# Patient Record
Sex: MALE | Race: WHITE | NOT HISPANIC OR LATINO | Employment: OTHER | URBAN - METROPOLITAN AREA
[De-identification: names, ages, dates, MRNs, and addresses within clinical notes are randomized per-mention and may not be internally consistent; named-entity substitution may affect disease eponyms.]

---

## 2017-01-10 ENCOUNTER — HOSPITAL ENCOUNTER (OUTPATIENT)
Dept: WOUND CARE | Facility: HOSPITAL | Age: 67
Discharge: HOME | End: 2017-01-10
Attending: PODIATRIST | Admitting: PODIATRIST

## 2017-01-27 ENCOUNTER — CONVERSION ENCOUNTER (OUTPATIENT)
Dept: FAMILY MEDICINE CLINIC | Facility: CLINIC | Age: 67
End: 2017-01-27

## 2017-02-10 ENCOUNTER — CONVERSION ENCOUNTER (OUTPATIENT)
Dept: FAMILY MEDICINE CLINIC | Facility: CLINIC | Age: 67
End: 2017-02-10

## 2017-02-24 ENCOUNTER — CONVERSION ENCOUNTER (OUTPATIENT)
Dept: FAMILY MEDICINE CLINIC | Facility: CLINIC | Age: 67
End: 2017-02-24

## 2017-03-10 ENCOUNTER — CONVERSION ENCOUNTER (OUTPATIENT)
Dept: FAMILY MEDICINE CLINIC | Facility: CLINIC | Age: 67
End: 2017-03-10

## 2017-05-12 ENCOUNTER — HOSPITAL ENCOUNTER (OUTPATIENT)
Dept: CARDIOLOGY | Facility: HOSPITAL | Age: 67
Discharge: HOME | End: 2017-05-12
Attending: PHYSICIAN ASSISTANT | Admitting: PHYSICIAN ASSISTANT

## 2017-05-30 ENCOUNTER — CONVERSION ENCOUNTER (OUTPATIENT)
Dept: FAMILY MEDICINE CLINIC | Facility: CLINIC | Age: 67
End: 2017-05-30

## 2017-07-28 ENCOUNTER — CONVERSION ENCOUNTER (OUTPATIENT)
Dept: FAMILY MEDICINE CLINIC | Facility: CLINIC | Age: 67
End: 2017-07-28

## 2017-08-04 ENCOUNTER — HOSPITAL ENCOUNTER (OUTPATIENT)
Dept: FAMILY MEDICINE CLINIC | Facility: CLINIC | Age: 67
Setting detail: SPECIMEN
Discharge: HOME | End: 2017-08-04
Attending: FAMILY MEDICINE | Admitting: FAMILY MEDICINE

## 2017-08-04 LAB
ALBUMIN SERPL-MCNC: 3.7 G/DL (ref 3.5–4.8)
ALBUMIN/GLOB SERPL: 1.1 {RATIO} (ref 1–1.7)
ALP SERPL-CCNC: 91 IU/L (ref 32–91)
ALT SERPL-CCNC: 25 IU/L (ref 17–63)
ANION GAP SERPL CALC-SCNC: 14.6 MMOL/L (ref 10–20)
AST SERPL-CCNC: 22 IU/L (ref 15–41)
BILIRUB SERPL-MCNC: 1 MG/DL (ref 0.3–1.2)
BUN SERPL-MCNC: 6 MG/DL (ref 8–20)
BUN/CREAT SERPL: 7.5 (ref 6.2–20.3)
CALCIUM SERPL-MCNC: 9.7 MG/DL (ref 8.9–10.3)
CHLORIDE SERPL-SCNC: 103 MMOL/L (ref 101–111)
CHOLEST SERPL-MCNC: 135 MG/DL
CHOLEST/HDLC SERPL: 3.6 {RATIO}
CONV CO2: 27 MMOL/L (ref 22–32)
CONV LDL CHOLESTEROL DIRECT: 79 MG/DL (ref 0–100)
CONV MICROALBUM.,U,RANDOM: 76 MG/L
CONV TOTAL PROTEIN: 7 G/DL (ref 6.1–7.9)
CREAT 24H UR-MCNC: 89.4 MG/DL
CREAT UR-MCNC: 0.8 MG/DL (ref 0.7–1.2)
GLOBULIN UR ELPH-MCNC: 3.3 G/DL (ref 2.5–3.8)
GLUCOSE SERPL-MCNC: 106 MG/DL (ref 65–99)
HDLC SERPL-MCNC: 38 MG/DL
LDLC/HDLC SERPL: 2.1 {RATIO}
LIPID INTERPRETATION: ABNORMAL
MICROALBUMIN/CREAT UR: 85 UG/MG
POTASSIUM SERPL-SCNC: 4.6 MMOL/L (ref 3.6–5.1)
SODIUM SERPL-SCNC: 140 MMOL/L (ref 136–144)
TRIGL SERPL-MCNC: 94 MG/DL
TSH SERPL-ACNC: 3.57 UIU/ML (ref 0.34–5.6)
VLDLC SERPL CALC-MCNC: 18.9 MG/DL

## 2017-10-27 ENCOUNTER — CONVERSION ENCOUNTER (OUTPATIENT)
Dept: FAMILY MEDICINE CLINIC | Facility: CLINIC | Age: 67
End: 2017-10-27

## 2017-11-21 ENCOUNTER — HOSPITAL ENCOUNTER (OUTPATIENT)
Dept: FAMILY MEDICINE CLINIC | Facility: CLINIC | Age: 67
Setting detail: SPECIMEN
Discharge: HOME | End: 2017-11-21
Attending: FAMILY MEDICINE | Admitting: FAMILY MEDICINE

## 2017-11-21 LAB
ALBUMIN SERPL-MCNC: 3.8 G/DL (ref 3.5–4.8)
ALBUMIN/GLOB SERPL: 1.1 {RATIO} (ref 1–1.7)
ALP SERPL-CCNC: 71 IU/L (ref 32–91)
ALT SERPL-CCNC: 18 IU/L (ref 17–63)
ANION GAP SERPL CALC-SCNC: 11.3 MMOL/L (ref 10–20)
AST SERPL-CCNC: 20 IU/L (ref 15–41)
BILIRUB SERPL-MCNC: 1.1 MG/DL (ref 0.3–1.2)
BUN SERPL-MCNC: 8 MG/DL (ref 8–20)
BUN/CREAT SERPL: 10 (ref 6.2–20.3)
CALCIUM SERPL-MCNC: 9.7 MG/DL (ref 8.9–10.3)
CHLORIDE SERPL-SCNC: 104 MMOL/L (ref 101–111)
CHOLEST SERPL-MCNC: 151 MG/DL
CHOLEST/HDLC SERPL: 3.5 {RATIO}
CONV CO2: 28 MMOL/L (ref 22–32)
CONV LDL CHOLESTEROL DIRECT: 91 MG/DL (ref 0–100)
CONV TOTAL PROTEIN: 7.4 G/DL (ref 6.1–7.9)
CREAT UR-MCNC: 0.8 MG/DL (ref 0.7–1.2)
GLOBULIN UR ELPH-MCNC: 3.6 G/DL (ref 2.5–3.8)
GLUCOSE SERPL-MCNC: 61 MG/DL (ref 65–99)
HDLC SERPL-MCNC: 43 MG/DL
LDLC/HDLC SERPL: 2.1 {RATIO}
LIPID INTERPRETATION: NORMAL
POTASSIUM SERPL-SCNC: 4.3 MMOL/L (ref 3.6–5.1)
SODIUM SERPL-SCNC: 139 MMOL/L (ref 136–144)
TRIGL SERPL-MCNC: 101 MG/DL
VLDLC SERPL CALC-MCNC: 16.8 MG/DL

## 2017-12-01 ENCOUNTER — CONVERSION ENCOUNTER (OUTPATIENT)
Dept: FAMILY MEDICINE CLINIC | Facility: CLINIC | Age: 67
End: 2017-12-01

## 2018-02-12 ENCOUNTER — HOSPITAL ENCOUNTER (OUTPATIENT)
Dept: FAMILY MEDICINE CLINIC | Facility: CLINIC | Age: 68
Setting detail: SPECIMEN
Discharge: HOME | End: 2018-02-12
Attending: FAMILY MEDICINE | Admitting: FAMILY MEDICINE

## 2018-02-12 ENCOUNTER — CONVERSION ENCOUNTER (OUTPATIENT)
Dept: FAMILY MEDICINE CLINIC | Facility: CLINIC | Age: 68
End: 2018-02-12

## 2018-02-12 LAB
ANION GAP SERPL CALC-SCNC: 10.9 MMOL/L (ref 10–20)
BUN SERPL-MCNC: 9 MG/DL (ref 8–20)
BUN/CREAT SERPL: 11.3 (ref 6.2–20.3)
CALCIUM SERPL-MCNC: 9.2 MG/DL (ref 8.9–10.3)
CHLORIDE SERPL-SCNC: 102 MMOL/L (ref 101–111)
CONV CO2: 24 MMOL/L (ref 22–32)
CREAT UR-MCNC: 0.8 MG/DL (ref 0.7–1.2)
GLUCOSE SERPL-MCNC: 171 MG/DL (ref 65–99)
POTASSIUM SERPL-SCNC: 3.9 MMOL/L (ref 3.6–5.1)
SODIUM SERPL-SCNC: 133 MMOL/L (ref 136–144)

## 2018-09-20 RX ORDER — TAMSULOSIN HYDROCHLORIDE 0.4 MG/1
1 CAPSULE ORAL DAILY
COMMUNITY

## 2018-09-20 RX ORDER — OMEPRAZOLE 20 MG/1
20 CAPSULE, DELAYED RELEASE ORAL DAILY
COMMUNITY

## 2018-09-20 RX ORDER — ACETAMINOPHEN 325 MG/1
650 TABLET ORAL EVERY 6 HOURS PRN
COMMUNITY

## 2018-09-20 RX ORDER — PRAVASTATIN SODIUM 10 MG
10 TABLET ORAL DAILY
COMMUNITY

## 2018-09-20 RX ORDER — LISINOPRIL 10 MG/1
10 TABLET ORAL DAILY
COMMUNITY

## 2018-09-20 RX ORDER — CETIRIZINE HYDROCHLORIDE 10 MG/1
10 TABLET ORAL DAILY
COMMUNITY

## 2018-09-20 RX ORDER — FLUTICASONE PROPIONATE 50 MCG
2 SPRAY, SUSPENSION (ML) NASAL DAILY
COMMUNITY

## 2018-09-20 RX ORDER — NAPROXEN 500 MG/1
500 TABLET ORAL DAILY
COMMUNITY
End: 2018-12-14 | Stop reason: HOSPADM

## 2018-09-21 ENCOUNTER — OFFICE VISIT (OUTPATIENT)
Dept: NEUROSURGERY | Facility: CLINIC | Age: 68
End: 2018-09-21

## 2018-09-21 VITALS
BODY MASS INDEX: 32.14 KG/M2 | RESPIRATION RATE: 16 BRPM | WEIGHT: 200 LBS | HEIGHT: 66 IN | HEART RATE: 64 BPM | SYSTOLIC BLOOD PRESSURE: 120 MMHG | DIASTOLIC BLOOD PRESSURE: 60 MMHG

## 2018-09-21 DIAGNOSIS — G89.29 CHRONIC RIGHT-SIDED LOW BACK PAIN WITH LEFT-SIDED SCIATICA: Primary | ICD-10-CM

## 2018-09-21 DIAGNOSIS — C80.1 CANCER (HCC): ICD-10-CM

## 2018-09-21 DIAGNOSIS — I10 HYPERTENSION, UNSPECIFIED TYPE: ICD-10-CM

## 2018-09-21 DIAGNOSIS — M48.062 SPINAL STENOSIS, LUMBAR REGION, WITH NEUROGENIC CLAUDICATION: ICD-10-CM

## 2018-09-21 DIAGNOSIS — M54.42 CHRONIC RIGHT-SIDED LOW BACK PAIN WITH LEFT-SIDED SCIATICA: Primary | ICD-10-CM

## 2018-09-21 DIAGNOSIS — M54.16 LUMBAR RADICULOPATHY: ICD-10-CM

## 2018-09-21 DIAGNOSIS — M51.36 DDD (DEGENERATIVE DISC DISEASE), LUMBAR: ICD-10-CM

## 2018-09-21 PROBLEM — M54.50 LOW BACK PAIN: Status: ACTIVE | Noted: 2018-09-21

## 2018-09-21 PROCEDURE — 99204 OFFICE O/P NEW MOD 45 MIN: CPT | Performed by: NEUROLOGICAL SURGERY

## 2018-09-21 RX ORDER — METHOCARBAMOL 750 MG/1
750 TABLET, FILM COATED ORAL NIGHTLY
Status: ON HOLD | COMMUNITY
End: 2018-12-14 | Stop reason: SDUPTHER

## 2018-09-21 RX ORDER — GABAPENTIN 300 MG/1
600 CAPSULE ORAL NIGHTLY
COMMUNITY

## 2018-09-21 NOTE — PROGRESS NOTES
Subjective   Patient ID: Damion Elizabeth is a 68 y.o. male is being seen for consultation today at the request of JENIFER Sands for back pain.    History of Present Illness    He presents to the office today at the request of his primary care provider for back pain.  He has had MRI lumbar spine imaging in [place] on April 12, 2018.  He is here for a second opinion after being seen by a neurosurgeon in [place] who suggested surgery.    Today, he reports long-standing low back and leg pain that has progressively worsened over the past year.  He reports pain is constant in the low back, worse on the right side than the left.  He has pain in the right buttock just below the right hip that is a constant stabbing sensation, worse with position changes and movement.  Pain in his legs is also constant and radiates posteriorly into both calves.  He describes it as a chronic aching discomfort.    He had an accident in 2011 where his the right foot got caught in a machine.  Since then, he has had chronic right foot weakness and pain.  He takes gabapentin and Robaxin daily for the discomfort.  Overall, pain is worse with sitting.  He denies any bowel or bladder issues.  He denies any numbness or tingling in either leg.    He is noted weakness in the left lower extremity as well as a dull aching discomfort in the left lower extremity which occurs intermittently while seated but reliably occurs when he gets up and walks.  If he reclines in a recliner then he does not have much discomfort in the leg.  He has also noted weakness in this left leg which is not been improved despite conservative treatment including epidural injections.  The chronic aching discomfort in his left lower extremity was slightly improved with the epidural injection but only for short periods of time.    He presents with his wife and young granddaughter.      /60 (BP Location: Left arm, Patient Position: Sitting, Cuff Size: Adult)    "Pulse 64   Resp 16   Ht 167.6 cm (66\")   Wt 90.7 kg (200 lb)   BMI 32.28 kg/m²       The following portions of the patient's history were reviewed and updated as appropriate: allergies, current medications, past family history, past medical history, past social history, past surgical history and problem list.    Review of Systems   Constitutional: Negative for fever.   HENT: Positive for trouble swallowing (secondary to radiation to throat).    Eyes: Negative for visual disturbance.   Respiratory: Positive for cough (seasonal). Negative for wheezing.    Cardiovascular: Negative for chest pain and palpitations.   Gastrointestinal: Negative for abdominal pain.   Genitourinary: Positive for enuresis. Negative for difficulty urinating.   Musculoskeletal: Positive for back pain and gait problem.   Skin: Negative for rash.   Neurological: Positive for weakness (BLE) and numbness (BLE). Negative for dizziness.   Psychiatric/Behavioral: Positive for sleep disturbance.     Past Medical History:   Diagnosis Date   • Arthritis    • Cancer (CMS/HCC)    • Hyperlipidemia    • Hypertension    • Low back pain        Past Surgical History:   Procedure Laterality Date   • FOOT SURGERY Right 2011   • INNER EAR SURGERY  1990    Ear Drum repair   • KNEE SURGERY  1985    cyst removal   • LEG DEBRIDEMENT Right 2011   • NECK DISSECTION  2015   • TONSILLECTOMY         Social History     Social History   • Marital status:      Spouse name: N/A   • Number of children: N/A   • Years of education: N/A     Occupational History   • farmer      Social History Main Topics   • Smoking status: Current Every Day Smoker   • Smokeless tobacco: Never Used   • Alcohol use 1.8 oz/week     3 Cans of beer per week   • Drug use: No   • Sexual activity: Defer     Other Topics Concern   • Not on file     Social History Narrative   • No narrative on file       Family History   Problem Relation Age of Onset   • Heart disease Mother    • Cancer Father  "   • Heart disease Father         No Known Allergies      Current Outpatient Prescriptions:   •  acetaminophen (TYLENOL) 325 MG tablet, Take 650 mg by mouth Every 6 (Six) Hours As Needed for Mild Pain ., Disp: , Rfl:   •  cetirizine (zyrTEC) 10 MG tablet, Take 10 mg by mouth Daily., Disp: , Rfl:   •  DiphenhydrAMINE HCl (BENADRYL ALLERGY PO), Take  by mouth., Disp: , Rfl:   •  fluticasone (FLONASE) 50 MCG/ACT nasal spray, 2 sprays into the nostril(s) as directed by provider Daily., Disp: , Rfl:   •  gabapentin (NEURONTIN) 300 MG capsule, 300 mg. Two capsules at bedtime, Disp: , Rfl:   •  lisinopril (PRINIVIL,ZESTRIL) 10 MG tablet, Take 10 mg by mouth Daily., Disp: , Rfl:   •  methocarbamol (ROBAXIN) 750 MG tablet, Take 750 mg by mouth Every Night., Disp: , Rfl:   •  naproxen (NAPROSYN) 500 MG tablet, Take 500 mg by mouth 2 (Two) Times a Day With Meals., Disp: , Rfl:   •  omeprazole (priLOSEC) 20 MG capsule, Take 20 mg by mouth Daily., Disp: , Rfl:   •  pravastatin (PRAVACHOL) 10 MG tablet, Take 10 mg by mouth Daily., Disp: , Rfl:   •  tamsulosin (FLOMAX) 0.4 MG capsule 24 hr capsule, Take 1 capsule by mouth Every Night., Disp: , Rfl:     Objective   Physical Exam   Constitutional: He is oriented to person, place, and time. Vital signs are normal. He appears well-developed and well-nourished. He is cooperative.   Very pleasant older gentleman   HENT:   Head: Normocephalic and atraumatic.   Neck: Neck supple.   Cardiovascular: Intact distal pulses.    Pulmonary/Chest: Effort normal.   Abdominal: Soft.   Musculoskeletal: Normal range of motion. He exhibits tenderness (Very tender right sciatic notch as well as paraspinal musculature in the upper lumbar spine). He exhibits no deformity.        Lumbar back: He exhibits tenderness, bony tenderness and pain. He exhibits normal range of motion.   Moving all extremities well  Left lower extremity weakness in the gastroc, anterior tibialis and hamstrings 4/5  Full lumbar range  of motion, pain in all directions   Neurological: He is alert and oriented to person, place, and time. He displays atrophy (Right calf atrophy, chronic and unchanged). He displays no tremor and normal reflexes. No cranial nerve deficit. He exhibits normal muscle tone. Gait abnormal. Coordination normal. GCS eye subscore is 4. GCS verbal subscore is 5. GCS motor subscore is 6.   Reflex Scores:       Patellar reflexes are 2+ on the right side and 2+ on the left side.       Achilles reflexes are 2+ on the right side and 2+ on the left side.  Gait is stable and upright, dragging of the right foot  Unable to stand on heels and toes on the right, difficulty on the left  Unable to tandem  Positive straight leg raise on the right  2B clonus on the right   Skin: Skin is warm and dry.   Psychiatric: He has a normal mood and affect. His behavior is normal. Thought content normal.   Vitals reviewed.    Neurologic Exam     Mental Status   Oriented to person, place, and time.     Gait, Coordination, and Reflexes     Reflexes   Right patellar: 2+  Left patellar: 2+  Right achilles: 2+  Left achilles: 2+      Assessment/Plan   Independent Review of Radiographic Studies:    I personally reviewed an MRI scan of lumbar spine done recently: This demonstrates degenerative arthritic changes at L4 5 and evidence of prior laminectomy on the left at L4 5.  There is also a disc osteophyte complex eccentric to the left with compression of the exiting L4 nerve root and the traversing L5 nerve root in the lateral recess.  Medical Decision Making:    I confirmed and obtained the above history as recorded by the nurse practitioner acting as a scribe. I performed the above examination and it is documented by the nurse practitioner acting as a scribe.    The patient presents with left lower extremity radiculopathy and lumbar spinal stenosis with neurogenic claudication symptomatology.  I explained to the patient and his wife that I thought this was  because the disc space.  Previously been operated is now essentially completely subsided there is almost bone-on-bone at this level which is causing compression of the nerve root on the left side more so than the right.  Complicating this particular area at L4 5 is a disc osteophyte complex which I explained to be a bone spur that developed as well or to the left than to the right and I think that that is irritating these nerve.  This is been an ongoing problem and he is developed weakness in the lower extremity as documented above.    Treatment options include continued conservative treatment and just living his life with this ongoing pain and progressive or consideration of some kind of operative intervention to decompress the nerve root with the hope of a substantial improvement in his leg pain.  We also that the strength would improve also which I think would be a reasonable health.    The question really revolves around once the safest way in order to decompress the nerve root with minimal amount of invasion into his body.  I'm concerned about a simple lumbar decompression with minimally invasive techniques for 2 reasons; this area has previously been operated and therefore there is scar tissue on the nerve roots and manipulating the nerve roots from the back a second time increases the risk of damaging her permanent.  Additionally there is disc space subsidence which is caused the pedicles of the vertebral bodies of L4 and L5 to be more closely approximated and I am not sure that unroofing that neural foramen would be adequate to decompress the nerve since the nerve is being compressed by bone above and below.  I think really the safest way to approach this is to elisha the disc space back up to give more room around the nerve root.  This can be accomplished with minimally invasive technique and percutaneous rods posteriorly.    The surgical alternative to the anterior approach is a posterior fusion with lumbar  decompression which in my opinion is much more invasive and also would require lumbar fusion with posterior lateral fusion as well.    Explained to the patient with the oblique lateral interbody fusion approach with posterior instrumentation I would expect in overnight or perhaps 2 days in the hospital and then discharged.  I also explained that sometimes it takes longer.  On rare occasion individuals react differently to various kinds of operation.  I explained that he might have to stay longer and hospital and might even need to go to some chondral rehabilitation facility but that was not my expectation and that we could likely expect 1 or 2 days in hospital and then home.  I also explained that the recovery period for this is generally 4-6 weeks with some limited activities.  I encouraged him to be active postoperatively with a lot of walking but to avoid bending and twisting until we've released him back to full duties.    The risks, benefits, and alternatives of decompression and fusion were explained in detail to the patient. The alternative is not to perform an operative procedure. The benefit should be, though is not guaranteed, primarily a potential reduction in the neurosensory and/or motor dysfunction; secondarily, a possible reduction in back pain. The risks include, but are not limited to, the possibility of death, infection, stroke, bleeding, blindness, paralysis, blindness, lack of improvement in the patient's pain syndrome, worsening of the pain syndrome, meningitis, osteomyelitis, recurrent disc herniation, need for further operative intervention, recurrence of the pain syndrome, sexual dysfunction, incontinence, inability to urinate without catheterization, inability to have a normal bowel movement, epidural scarring resulting in chronic back pain, leakage of cerebral spinal fluid at the time of surgery or after recovery from surgery requiring further operative intervention, lack of bony fusion  requiring further surgery, instrumentation breakdown or pull out, adjacent level spinal degeneration, spinal instability. I also explained the realistic expectations as they pertain to the procedure. The patient voiced understanding of these risks, benefits, alternatives, and realistic expectations.    After a complete physical exam, the patient has been informed of the consequences, benefits, appropriate us, and office policies regarding the medication being prescribed. A ANIA check will be made on-line, and will be repeated if prescription is renewed after a 90 day period. The patient agrees to adhering to the medication regimen as prescribed.    The patient has been advised that we will manage post-operative pain for 1 month. If further narcotic medication is needed beyond that period, a referral back to the primary care physician or to a pain management specialist will be made. If the patient cancels or fails to show for scheduled follow-up visits or the pain management referral,  further narcotic prescriptions from this practice may cease.    The patient will give us a call when he is ready to schedule surgery.  We will have him come back for preoperative visit at that time to go over things once again and to arrange the surgery.    Damion was seen today for back pain.    Diagnoses and all orders for this visit:    Chronic right-sided low back pain with left-sided sciatica    Lumbar radiculopathy  -     Case Request; Standing  -     CBC and Differential; Future  -     Basic metabolic panel; Future  -     Sedimentation rate; Future  -     lactated ringers infusion; Infuse 100 mL/hr into a venous catheter Continuous.  -     ceFAZolin (ANCEF) 2 g in sodium chloride 0.9 % 100 mL IVPB; Infuse 2 g into a venous catheter 1 (One) Time.  -     dexamethasone (DECADRON) 4 mg in sodium chloride 0.9 % IVPB; Infuse 4 mg into a venous catheter Every 6 (Six) Hours.  -     Case Request    DDD (degenerative disc disease),  lumbar    Spinal stenosis, lumbar region, with neurogenic claudication  -     Case Request; Standing  -     CBC and Differential; Future  -     Basic metabolic panel; Future  -     Sedimentation rate; Future  -     lactated ringers infusion; Infuse 100 mL/hr into a venous catheter Continuous.  -     ceFAZolin (ANCEF) 2 g in sodium chloride 0.9 % 100 mL IVPB; Infuse 2 g into a venous catheter 1 (One) Time.  -     dexamethasone (DECADRON) 4 mg in sodium chloride 0.9 % IVPB; Infuse 4 mg into a venous catheter Every 6 (Six) Hours.  -     Case Request    Cancer (CMS/[facility])    Hypertension, unspecified type    Other orders  -     Follow anesthesia standing orders.  -     Clorhexidine skin prep; Future  -     Follow anesthesia standing orders.; Standing  -     Verify NPO Status; Standing  -     SCD (sequential compression device)- to be placed on patient in Pre-op; Standing  -     POC Glucose Once; Standing  -     Inpatient Admission; Standing  -     Obtain informed consent      Return for Patient to call this week about surgery..         November 6, 2018 The patient called back and requests surgical intervention.

## 2018-11-05 ENCOUNTER — TELEPHONE (OUTPATIENT)
Dept: NEUROSURGERY | Facility: CLINIC | Age: 68
End: 2018-11-05

## 2018-11-05 NOTE — TELEPHONE ENCOUNTER
Patient was last seen on 9/21 for LBP. Pt was busy with crops and could not schedule surgery at that time.     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  In his last office note Dr Verde said they need a visit before scheduling surgery.  Pt 's wife was hoping that he could get in and have surgery before the end of the year.    Dr Verde next available is on 12/14   Please advise      Shannon [phone]

## 2018-11-05 NOTE — TELEPHONE ENCOUNTER
Unfortunately at this time of the year we have multiple requests just like this. I gave him next available Friday, 12-14 with name on cancellation list.

## 2018-11-06 ENCOUNTER — TELEPHONE (OUTPATIENT)
Dept: NEUROSURGERY | Facility: CLINIC | Age: 68
End: 2018-11-06

## 2018-11-06 RX ORDER — DEXAMETHASONE SODIUM PHOSPHATE 4 MG/ML
4 INJECTION, SOLUTION INTRA-ARTICULAR; INTRALESIONAL; INTRAMUSCULAR; INTRAVENOUS; SOFT TISSUE EVERY 6 HOURS
Status: CANCELLED | OUTPATIENT
Start: 2018-12-13 | End: 2018-12-13

## 2018-11-06 RX ORDER — SODIUM CHLORIDE, SODIUM LACTATE, POTASSIUM CHLORIDE, CALCIUM CHLORIDE 600; 310; 30; 20 MG/100ML; MG/100ML; MG/100ML; MG/100ML
100 INJECTION, SOLUTION INTRAVENOUS CONTINUOUS
Status: CANCELLED | OUTPATIENT
Start: 2018-12-13

## 2018-11-06 RX ORDER — CEFAZOLIN SODIUM 2 G/100ML
2 INJECTION, SOLUTION INTRAVENOUS ONCE
Status: CANCELLED | OUTPATIENT
Start: 2018-12-13 | End: 2018-11-06

## 2018-11-08 ENCOUNTER — TELEPHONE (OUTPATIENT)
Dept: NEUROSURGERY | Facility: CLINIC | Age: 68
End: 2018-11-08

## 2018-11-08 DIAGNOSIS — Z98.890 POST-OPERATIVE STATE: ICD-10-CM

## 2018-11-08 DIAGNOSIS — M54.16 LUMBAR RADICULOPATHY: Primary | ICD-10-CM

## 2018-11-08 NOTE — TELEPHONE ENCOUNTER
Please enter order for Post Op lumbar xray AP/LAT for diagnosis of lumbar radiculopathy. His post op appt with on December 31 at 11:30 with Ana Cristina. Thanks!

## 2018-11-11 ENCOUNTER — RESULTS ENCOUNTER (OUTPATIENT)
Dept: NEUROSURGERY | Facility: CLINIC | Age: 68
End: 2018-11-11

## 2018-11-11 DIAGNOSIS — M54.16 LUMBAR RADICULOPATHY: ICD-10-CM

## 2018-11-11 DIAGNOSIS — M48.062 SPINAL STENOSIS, LUMBAR REGION, WITH NEUROGENIC CLAUDICATION: ICD-10-CM

## 2018-11-15 ENCOUNTER — APPOINTMENT (OUTPATIENT)
Dept: PREADMISSION TESTING | Facility: HOSPITAL | Age: 68
End: 2018-11-15

## 2018-11-15 ENCOUNTER — OFFICE VISIT (OUTPATIENT)
Dept: NEUROSURGERY | Facility: CLINIC | Age: 68
End: 2018-11-15

## 2018-11-15 VITALS
BODY MASS INDEX: 32.93 KG/M2 | DIASTOLIC BLOOD PRESSURE: 80 MMHG | RESPIRATION RATE: 16 BRPM | HEART RATE: 92 BPM | WEIGHT: 204 LBS | SYSTOLIC BLOOD PRESSURE: 140 MMHG

## 2018-11-15 VITALS
RESPIRATION RATE: 18 BRPM | HEIGHT: 67 IN | DIASTOLIC BLOOD PRESSURE: 87 MMHG | OXYGEN SATURATION: 97 % | TEMPERATURE: 99 F | SYSTOLIC BLOOD PRESSURE: 142 MMHG | BODY MASS INDEX: 31.64 KG/M2 | WEIGHT: 201.6 LBS | HEART RATE: 95 BPM

## 2018-11-15 DIAGNOSIS — M51.36 DDD (DEGENERATIVE DISC DISEASE), LUMBAR: ICD-10-CM

## 2018-11-15 DIAGNOSIS — M54.16 LUMBAR RADICULOPATHY: ICD-10-CM

## 2018-11-15 DIAGNOSIS — G89.29 CHRONIC RIGHT-SIDED LOW BACK PAIN WITH LEFT-SIDED SCIATICA: Primary | ICD-10-CM

## 2018-11-15 DIAGNOSIS — M54.42 CHRONIC RIGHT-SIDED LOW BACK PAIN WITH LEFT-SIDED SCIATICA: Primary | ICD-10-CM

## 2018-11-15 DIAGNOSIS — M48.062 SPINAL STENOSIS, LUMBAR REGION, WITH NEUROGENIC CLAUDICATION: ICD-10-CM

## 2018-11-15 LAB
ANION GAP SERPL CALCULATED.3IONS-SCNC: 15.2 MMOL/L
BASOPHILS # BLD AUTO: 0.02 10*3/MM3 (ref 0–0.2)
BASOPHILS NFR BLD AUTO: 0.4 % (ref 0–1.5)
BUN BLD-MCNC: 5 MG/DL (ref 8–23)
BUN/CREAT SERPL: 6.7 (ref 7–25)
CALCIUM SPEC-SCNC: 9.8 MG/DL (ref 8.6–10.5)
CHLORIDE SERPL-SCNC: 96 MMOL/L (ref 98–107)
CO2 SERPL-SCNC: 24.8 MMOL/L (ref 22–29)
CREAT BLD-MCNC: 0.75 MG/DL (ref 0.76–1.27)
DEPRECATED RDW RBC AUTO: 46.6 FL (ref 37–54)
EOSINOPHIL # BLD AUTO: 0.08 10*3/MM3 (ref 0–0.7)
EOSINOPHIL NFR BLD AUTO: 1.7 % (ref 0.3–6.2)
ERYTHROCYTE [DISTWIDTH] IN BLOOD BY AUTOMATED COUNT: 12.4 % (ref 11.5–14.5)
ERYTHROCYTE [SEDIMENTATION RATE] IN BLOOD: 1 MM/HR (ref 0–20)
GFR SERPL CREATININE-BSD FRML MDRD: 104 ML/MIN/1.73
GLUCOSE BLD-MCNC: 95 MG/DL (ref 65–99)
HCT VFR BLD AUTO: 45.4 % (ref 40.4–52.2)
HGB BLD-MCNC: 15.7 G/DL (ref 13.7–17.6)
IMM GRANULOCYTES # BLD: 0 10*3/MM3 (ref 0–0.03)
IMM GRANULOCYTES NFR BLD: 0 % (ref 0–0.5)
LYMPHOCYTES # BLD AUTO: 0.91 10*3/MM3 (ref 0.9–4.8)
LYMPHOCYTES NFR BLD AUTO: 19 % (ref 19.6–45.3)
MCH RBC QN AUTO: 35.3 PG (ref 27–32.7)
MCHC RBC AUTO-ENTMCNC: 34.6 G/DL (ref 32.6–36.4)
MCV RBC AUTO: 102 FL (ref 79.8–96.2)
MONOCYTES # BLD AUTO: 0.54 10*3/MM3 (ref 0.2–1.2)
MONOCYTES NFR BLD AUTO: 11.3 % (ref 5–12)
NEUTROPHILS # BLD AUTO: 3.24 10*3/MM3 (ref 1.9–8.1)
NEUTROPHILS NFR BLD AUTO: 67.6 % (ref 42.7–76)
PLATELET # BLD AUTO: 109 10*3/MM3 (ref 140–500)
PMV BLD AUTO: 9.6 FL (ref 6–12)
POTASSIUM BLD-SCNC: 3.9 MMOL/L (ref 3.5–5.2)
RBC # BLD AUTO: 4.45 10*6/MM3 (ref 4.6–6)
SODIUM BLD-SCNC: 136 MMOL/L (ref 136–145)
WBC NRBC COR # BLD: 4.79 10*3/MM3 (ref 4.5–10.7)

## 2018-11-15 PROCEDURE — 36415 COLL VENOUS BLD VENIPUNCTURE: CPT | Performed by: NEUROLOGICAL SURGERY

## 2018-11-15 PROCEDURE — 93005 ELECTROCARDIOGRAM TRACING: CPT

## 2018-11-15 PROCEDURE — 99213 OFFICE O/P EST LOW 20 MIN: CPT | Performed by: NEUROLOGICAL SURGERY

## 2018-11-15 PROCEDURE — 93010 ELECTROCARDIOGRAM REPORT: CPT | Performed by: INTERNAL MEDICINE

## 2018-11-15 PROCEDURE — 80048 BASIC METABOLIC PNL TOTAL CA: CPT | Performed by: NEUROLOGICAL SURGERY

## 2018-11-15 PROCEDURE — 85652 RBC SED RATE AUTOMATED: CPT | Performed by: NEUROLOGICAL SURGERY

## 2018-11-15 PROCEDURE — 85025 COMPLETE CBC W/AUTO DIFF WBC: CPT | Performed by: NEUROLOGICAL SURGERY

## 2018-11-15 NOTE — DISCHARGE INSTRUCTIONS
Take the following medications the morning of surgery with a small sip of water:  OMEPRAZOLE    Arrive to hospital on your day of surgery at 6:00 AM.      General Instructions:  • Do not eat or drink anything after midnight the night before surgery.  • Infants may have breast milk up to four hours before surgery.  • Infants drinking formula may drink formula up to six hours before surgery.   • Patients who avoid smoking, chewing tobacco and alcohol for 4 weeks prior to surgery have a reduced risk of post-operative complications.  Quit smoking as many days before surgery as you can.  • Do not smoke, use chewing tobacco or drink alcohol the day of surgery.   • If applicable bring your C-PAP/ BI-PAP machine.  • Bring any papers given to you in the doctor’s office.  • Wear clean comfortable clothes and socks.  • Do not wear contact lenses or make-up.  Bring a case for your glasses.   • Bring crutches or walker if applicable.  • Remove all piercings.  Leave jewelry and any other valuables at home.  • Hair extensions with metal clips must be removed prior to surgery.  • The Pre-Admission Testing nurse will instruct you to bring medications if unable to obtain an accurate list in Pre-Admission Testing.        If you were given a blood bank ID arm band remember to bring it with you the day of surgery.    Preventing a Surgical Site Infection:  • For 2 to 3 days before surgery, avoid shaving with a razor because the razor can irritate skin and make it easier to develop an infection.    • Any areas of open skin can increase the risk of a post-operative wound infection by allowing bacteria to enter and travel throughout the body.  Notify your surgeon if you have any skin wounds / rashes even if it is not near the expected surgical site.  The area will need assessed to determine if surgery should be delayed until it is healed.  • The night prior to surgery sleep in a clean bed with clean clothing.  Do not allow pets to sleep  with you.  • Shower on the morning of surgery using a fresh bar of anti-bacterial soap (such as Dial) and clean washcloth.  Dry with a clean towel and dress in clean clothing.  • Ask your surgeon if you will be receiving antibiotics prior to surgery.  • Make sure you, your family, and all healthcare providers clean their hands with soap and water or an alcohol based hand  before caring for you or your wound.    Day of surgery:  Upon arrival, a Pre-op nurse and Anesthesiologist will review your health history, obtain vital signs, and answer questions you may have.  The only belongings needed at this time will be your home medications and if applicable your C-PAP/BI-PAP machine.  If you are staying overnight your family can leave the rest of your belongings in the car and bring them to your room later.  A Pre-op nurse will start an IV and you may receive medication in preparation for surgery, including something to help you relax.  Your family will be able to see you in the Pre-op area.  While you are in surgery your family should notify the waiting room  if they leave the waiting room area and provide a contact phone number.    Please be aware that surgery does come with discomfort.  We want to make every effort to control your discomfort so please discuss any uncontrolled symptoms with your nurse.   Your doctor will most likely have prescribed pain medications.      If you are going home after surgery you will receive individualized written care instructions before being discharged.  A responsible adult must drive you to and from the hospital on the day of your surgery and stay with you for 24 hours.    If you are staying overnight following surgery, you will be transported to your hospital room following the recovery period.  [facility] has all private rooms.    You have received a list of surgical assistants for your reference.  If you have any questions please call  Pre-Admission Testing at [phone].  Deductibles and co-payments are collected on the day of service. Please be prepared to pay the required co-pay, deductible or deposit on the day of service as defined by your plan.

## 2018-11-15 NOTE — PROGRESS NOTES
Subjective   Patient ID: Damion Elizabeth is a 68 y.o. male is here today for follow-up of back pain and for surgery discussion.    History of Present Illness  Patient presents for presurgery discussion. He continues to have constant burning in his low back with bilateral posterior thigh pain that is dull, aching. It is aggravated by prolonged standing, sitting. He has chronic difficulty walking due to prior right foot crush injury. Some numbness posterior thighs. He has urinary frequency but no incontinence. He has PAT today and appt with Dr. Torres for surgical discussion on 11/27.     The following portions of the patient's history were reviewed and updated as appropriate: allergies, current medications, past family history, past medical history, past social history, past surgical history and problem list.    Review of Systems   Constitutional: Negative for fever.   HENT: Negative for postnasal drip and sore throat.    Respiratory: Negative for cough and wheezing.    Cardiovascular: Negative for chest pain and palpitations.   Genitourinary: Positive for frequency. Negative for difficulty urinating and enuresis.   Musculoskeletal: Positive for back pain (into both) and gait problem.   Neurological: Positive for weakness and numbness (posterior thighs).       Objective   Physical Exam   Constitutional: He is oriented to person, place, and time. He appears well-developed and well-nourished.   Body mass index is 32.93 kg/m².     Neck:   Taught skin left neck due to prior surgical resection and radiation of cancer   Cardiovascular: Normal rate, regular rhythm and normal heart sounds.   No murmur heard.  Pulmonary/Chest: Effort normal.   Abdominal: Soft.   Musculoskeletal:   TLSO brace   Neurological: He is oriented to person, place, and time. He has normal strength. Gait normal.   Reflex Scores:       Patellar reflexes are 2+ on the right side and 2+ on the left side.       Achilles reflexes are 1+ on the right side  and 1+ on the left side.  Skin: Skin is warm and dry.   Well healed midline lumbar incision   Psychiatric: He has a normal mood and affect. His behavior is normal. Judgment normal.   Vitals reviewed.    Neurologic Exam     Mental Status   Oriented to person, place, and time.   Level of consciousness: alert  Knowledge: good.   Normal comprehension.     Motor Exam   Muscle bulk: normal    Strength   Strength 5/5 throughout.     Sensory Exam   Right leg vibration: decreased from knee  Left leg vibration: normal    Gait, Coordination, and Reflexes     Gait  Gait: normal    Reflexes   Right patellar: 2+  Left patellar: 2+  Right achilles: 1+  Left achilles: 1+  Unable to toe walk on right; unable to heel walk bilaterally       Assessment/Plan   Independent Review of Radiographic Studies:    No new imaging    Medical Decision Making:    The patient is ready to schedule surgery.    I explained the risks, benefits, and alternatives of bar oblique lateral interbody fusion with posterior lateral fusion and instrumentation.  Risks include but are not limited to the possibility of death, infection, bleeding, vascular injury requiring more extensive surgery and vascular repair, transfusion, lack of fusion, instrumentation breakdown or pullout, need for further surgery, paralysis, bowel or bladder dysfunction, lack of improvement in pain syndrome etc.  I explained the alternative of surgery as being either a simple lumbar decompression or a transforaminal lumbar interbody fusion.  I explained that in my opinion the oblique lateral interbody fusion was a safer and more effective alternative than either of the other surgical alternatives.  I explained that the benefit should be though not guaranteed a reduction in the pain syndrome primarily in the legs and hopefully in the back.  The patient voiced understanding and requests that we proceed with surgical intervention.    After a complete physical exam, the patient has been  informed of the consequences, benefits, appropriate us, and office policies regarding the medication being prescribed. A ANIA check will be made on-line, and will be repeated if prescription is renewed after a 90 day period. The patient agrees to adhering to the medication regimen as prescribed.    The patient has been advised that we will manage post-operative pain for 1 month. If further narcotic medication is needed beyond that period, a referral back to the primary care physician or to a pain management specialist will be made. If the patient cancels or fails to show for scheduled follow-up visits or the pain management referral,  further narcotic prescriptions from this practice may cease.      Damion was seen today for lumbar surgery discussion.    Diagnoses and all orders for this visit:    Chronic right-sided low back pain with left-sided sciatica    Lumbar radiculopathy    Spinal stenosis, lumbar region, with neurogenic claudication    DDD (degenerative disc disease), lumbar      Return for Recheck 2 weeks after surgery, Follow up with nurse practitioner.

## 2018-11-27 ENCOUNTER — HOSPITAL ENCOUNTER (OUTPATIENT)
Dept: CARDIOLOGY | Facility: HOSPITAL | Age: 68
Discharge: HOME | End: 2018-11-27
Attending: INTERNAL MEDICINE | Admitting: INTERNAL MEDICINE

## 2018-11-27 ENCOUNTER — OFFICE VISIT (OUTPATIENT)
Dept: CARDIOLOGY | Facility: CLINIC | Age: 68
End: 2018-11-27

## 2018-11-27 VITALS
BODY MASS INDEX: 32.95 KG/M2 | DIASTOLIC BLOOD PRESSURE: 80 MMHG | SYSTOLIC BLOOD PRESSURE: 122 MMHG | HEIGHT: 66 IN | HEART RATE: 106 BPM | WEIGHT: 205 LBS | OXYGEN SATURATION: 97 %

## 2018-11-27 VITALS
DIASTOLIC BLOOD PRESSURE: 80 MMHG | BODY MASS INDEX: 32.95 KG/M2 | HEART RATE: 127 BPM | HEIGHT: 66 IN | WEIGHT: 205 LBS | SYSTOLIC BLOOD PRESSURE: 122 MMHG

## 2018-11-27 DIAGNOSIS — R06.02 SOB (SHORTNESS OF BREATH): ICD-10-CM

## 2018-11-27 DIAGNOSIS — R94.31 ABNORMAL ECG: ICD-10-CM

## 2018-11-27 DIAGNOSIS — R00.0 TACHYCARDIA: ICD-10-CM

## 2018-11-27 DIAGNOSIS — I10 HYPERTENSION, UNSPECIFIED TYPE: ICD-10-CM

## 2018-11-27 DIAGNOSIS — Z01.810 PREOP CARDIOVASCULAR EXAM: Primary | ICD-10-CM

## 2018-11-27 DIAGNOSIS — I10 HYPERTENSION, UNSPECIFIED TYPE: Chronic | ICD-10-CM

## 2018-11-27 DIAGNOSIS — Z01.810 PREOP CARDIOVASCULAR EXAM: ICD-10-CM

## 2018-11-27 LAB
AORTIC ARCH: 2.5 CM
AORTIC DIMENSIONLESS INDEX: 0.9 (DI)
ASCENDING AORTA: 3.3 CM
BH CV ECHO MEAS - ACS: 2.2 CM
BH CV ECHO MEAS - AO MAX PG (FULL): -0.03 MMHG
BH CV ECHO MEAS - AO MAX PG: 4.7 MMHG
BH CV ECHO MEAS - AO MEAN PG (FULL): 0.61 MMHG
BH CV ECHO MEAS - AO MEAN PG: 3.4 MMHG
BH CV ECHO MEAS - AO ROOT AREA (BSA CORRECTED): 2
BH CV ECHO MEAS - AO ROOT AREA: 13.1 CM^2
BH CV ECHO MEAS - AO ROOT DIAM: 4.1 CM
BH CV ECHO MEAS - AO V2 MAX: 108.6 CM/SEC
BH CV ECHO MEAS - AO V2 MEAN: 89.3 CM/SEC
BH CV ECHO MEAS - AO V2 VTI: 22.6 CM
BH CV ECHO MEAS - ASC AORTA: 3.3 CM
BH CV ECHO MEAS - AVA(I,A): 2.8 CM^2
BH CV ECHO MEAS - AVA(I,D): 2.8 CM^2
BH CV ECHO MEAS - AVA(V,A): 3.2 CM^2
BH CV ECHO MEAS - AVA(V,D): 3.2 CM^2
BH CV ECHO MEAS - BSA(HAYCOCK): 2.1 M^2
BH CV ECHO MEAS - BSA: 2 M^2
BH CV ECHO MEAS - BZI_BMI: 33.1 KILOGRAMS/M^2
BH CV ECHO MEAS - BZI_METRIC_HEIGHT: 167.6 CM
BH CV ECHO MEAS - BZI_METRIC_WEIGHT: 93 KG
BH CV ECHO MEAS - EDV(CUBED): 125.3 ML
BH CV ECHO MEAS - EDV(MOD-SP2): 60 ML
BH CV ECHO MEAS - EDV(MOD-SP4): 46 ML
BH CV ECHO MEAS - EDV(TEICH): 118.5 ML
BH CV ECHO MEAS - EF(CUBED): 64.4 %
BH CV ECHO MEAS - EF(MOD-BP): 69 %
BH CV ECHO MEAS - EF(MOD-SP2): 66.7 %
BH CV ECHO MEAS - EF(MOD-SP4): 67.4 %
BH CV ECHO MEAS - EF(TEICH): 55.7 %
BH CV ECHO MEAS - ESV(CUBED): 44.6 ML
BH CV ECHO MEAS - ESV(MOD-SP2): 20 ML
BH CV ECHO MEAS - ESV(MOD-SP4): 15 ML
BH CV ECHO MEAS - ESV(TEICH): 52.5 ML
BH CV ECHO MEAS - FS: 29.1 %
BH CV ECHO MEAS - IVS/LVPW: 1
BH CV ECHO MEAS - IVSD: 0.97 CM
BH CV ECHO MEAS - LAT PEAK E' VEL: 12 CM/SEC
BH CV ECHO MEAS - LV DIASTOLIC VOL/BSA (35-75): 22.8 ML/M^2
BH CV ECHO MEAS - LV MASS(C)D: 175.3 GRAMS
BH CV ECHO MEAS - LV MASS(C)DI: 86.7 GRAMS/M^2
BH CV ECHO MEAS - LV MAX PG: 4.7 MMHG
BH CV ECHO MEAS - LV MEAN PG: 2.8 MMHG
BH CV ECHO MEAS - LV SYSTOLIC VOL/BSA (12-30): 7.4 ML/M^2
BH CV ECHO MEAS - LV V1 MAX: 108.9 CM/SEC
BH CV ECHO MEAS - LV V1 MEAN: 80.9 CM/SEC
BH CV ECHO MEAS - LV V1 VTI: 19.7 CM
BH CV ECHO MEAS - LVIDD: 5 CM
BH CV ECHO MEAS - LVIDS: 3.5 CM
BH CV ECHO MEAS - LVLD AP2: 7.7 CM
BH CV ECHO MEAS - LVLD AP4: 7 CM
BH CV ECHO MEAS - LVLS AP2: 6.3 CM
BH CV ECHO MEAS - LVLS AP4: 6.1 CM
BH CV ECHO MEAS - LVOT AREA (M): 3.1 CM^2
BH CV ECHO MEAS - LVOT AREA: 3.2 CM^2
BH CV ECHO MEAS - LVOT DIAM: 2 CM
BH CV ECHO MEAS - LVPWD: 0.97 CM
BH CV ECHO MEAS - MED PEAK E' VEL: 6 CM/SEC
BH CV ECHO MEAS - MV A DUR: 0.1 SEC
BH CV ECHO MEAS - MV A MAX VEL: 119.7 CM/SEC
BH CV ECHO MEAS - MV DEC SLOPE: 819.2 CM/SEC^2
BH CV ECHO MEAS - MV DEC TIME: 0.08 SEC
BH CV ECHO MEAS - MV E MAX VEL: 48 CM/SEC
BH CV ECHO MEAS - MV E/A: 0.4
BH CV ECHO MEAS - MV MAX PG: 6.4 MMHG
BH CV ECHO MEAS - MV MEAN PG: 3.4 MMHG
BH CV ECHO MEAS - MV P1/2T MAX VEL: 66.5 CM/SEC
BH CV ECHO MEAS - MV P1/2T: 23.8 MSEC
BH CV ECHO MEAS - MV V2 MAX: 126.2 CM/SEC
BH CV ECHO MEAS - MV V2 MEAN: 84.2 CM/SEC
BH CV ECHO MEAS - MV V2 VTI: 16.6 CM
BH CV ECHO MEAS - MVA P1/2T LCG: 3.3 CM^2
BH CV ECHO MEAS - MVA(P1/2T): 9.3 CM^2
BH CV ECHO MEAS - MVA(VTI): 3.8 CM^2
BH CV ECHO MEAS - PA ACC TIME: 0.06 SEC
BH CV ECHO MEAS - PA MAX PG (FULL): 1.1 MMHG
BH CV ECHO MEAS - PA MAX PG: 6.2 MMHG
BH CV ECHO MEAS - PA PR(ACCEL): 52.1 MMHG
BH CV ECHO MEAS - PA V2 MAX: 124.9 CM/SEC
BH CV ECHO MEAS - PULM A REVS DUR: 0.1 SEC
BH CV ECHO MEAS - PULM A REVS VEL: 50.9 CM/SEC
BH CV ECHO MEAS - PULM DIAS VEL: 42.2 CM/SEC
BH CV ECHO MEAS - PULM S/D: 1.9
BH CV ECHO MEAS - PULM SYS VEL: 82 CM/SEC
BH CV ECHO MEAS - PVA(V,A): 3.1 CM^2
BH CV ECHO MEAS - PVA(V,D): 3.1 CM^2
BH CV ECHO MEAS - QP/QS: 0.83
BH CV ECHO MEAS - RV MAX PG: 5.1 MMHG
BH CV ECHO MEAS - RV MEAN PG: 3.5 MMHG
BH CV ECHO MEAS - RV V1 MAX: 113.2 CM/SEC
BH CV ECHO MEAS - RV V1 MEAN: 90.2 CM/SEC
BH CV ECHO MEAS - RV V1 VTI: 15.2 CM
BH CV ECHO MEAS - RVOT AREA: 3.4 CM^2
BH CV ECHO MEAS - RVOT DIAM: 2.1 CM
BH CV ECHO MEAS - SI(AO): 146.6 ML/M^2
BH CV ECHO MEAS - SI(CUBED): 39.9 ML/M^2
BH CV ECHO MEAS - SI(LVOT): 31.1 ML/M^2
BH CV ECHO MEAS - SI(MOD-SP2): 19.8 ML/M^2
BH CV ECHO MEAS - SI(MOD-SP4): 15.3 ML/M^2
BH CV ECHO MEAS - SI(TEICH): 32.6 ML/M^2
BH CV ECHO MEAS - SV(AO): 296.2 ML
BH CV ECHO MEAS - SV(CUBED): 80.7 ML
BH CV ECHO MEAS - SV(LVOT): 62.9 ML
BH CV ECHO MEAS - SV(MOD-SP2): 40 ML
BH CV ECHO MEAS - SV(MOD-SP4): 31 ML
BH CV ECHO MEAS - SV(RVOT): 52.3 ML
BH CV ECHO MEAS - SV(TEICH): 65.9 ML
BH CV ECHO MEAS - TAPSE (>1.6): 2.2 CM2
BH CV ECHO MEASUREMENTS AVERAGE E/E' RATIO: 5.33
BH CV VAS BP RIGHT ARM: NORMAL MMHG
BH CV XLRA - RV BASE: 2.5 CM
BH CV XLRA - TDI S': 18.3 CM/SEC
LEFT ATRIUM VOLUME INDEX: 14 ML/M2
LV EF 2D ECHO EST: 69 %
MAXIMAL PREDICTED HEART RATE: 154 BPM
SINUS: 3.5 CM
STJ: 3 CM
STRESS TARGET HR: 131 BPM

## 2018-11-27 PROCEDURE — 93306 TTE W/DOPPLER COMPLETE: CPT | Performed by: INTERNAL MEDICINE

## 2018-11-27 PROCEDURE — 93000 ELECTROCARDIOGRAM COMPLETE: CPT | Performed by: INTERNAL MEDICINE

## 2018-11-27 PROCEDURE — 93306 TTE W/DOPPLER COMPLETE: CPT

## 2018-11-27 PROCEDURE — 99205 OFFICE O/P NEW HI 60 MIN: CPT | Performed by: INTERNAL MEDICINE

## 2018-11-27 NOTE — PROGRESS NOTES
Subjective:     Encounter Date:11/27/2018      Patient ID: Damion Elizabeth is a 68 y.o. male.    Chief Complaint: preop cards  History of Present Illness    Dear Dr. Verde,    I had the pleasure of seeing this patient in the office today for initial evaluation and consultation.  He comes in for assessment of cardiac risk prior to lumbar fusion.  I appreciate the you sent him to see us.    He is scheduled for lumbar fusion on December 13.  He was recently in to see his oncologist and was noted to have a tachycardia around 100 beats a minute.  I notice when he was in your office his heart rate was close to 100 is well.  He doesn't have any sensation of tachycardia.  He has been getting short of breath at times but states this is been chronic for him.  There is been no recent change in his dyspnea, and is only with activity.  No shortness breath at rest.  No orthopnea or PND.  He has not had any lower extremity edema.  He denies any dizziness or lightheadedness.  He's had no chest pain, pressure, tightness, squeezing, or heartburn.  Sometimes he feels a little fatigued.  He had a stress test maybe 20 years ago that was normal.  He does have a history of prior throat cancer, and when he saw his oncologist last month he had blood work including a CBC, CMP, and thyroid function tests which were all normal.    This patient has no known cardiac history.  This patient has no history of coronary artery disease, congestive heart failure, rheumatic fever, rheumatic heart disease, congenital heart disease or heart murmur.  This patient has never required invasive cardiovascular evaluation.    The following portions of the patient's history were reviewed and updated as appropriate: allergies, current medications, past family history, past medical history, past social history, past surgical history and problem list.    Past Medical History:   Diagnosis Date   • Arthritis    • BPH (benign prostatic hyperplasia)    • Cancer  "(CMS/[facility])    • DDD (degenerative disc disease), lumbar    • GERD (gastroesophageal reflux disease)    • Hearing loss, right    • History of cancer tonsil 2015    HAD SURGERY, RADIATION AND CHEMO   • Hyperlipidemia    • Hypertension    • Low back pain     RADIATES DOWN BLE, WORSE ON LT   • Lumbar radiculopathy    • Shortness of breath     R/T \"CORN DUST\" FROM FARMING   • Spinal stenosis, lumbar region, with neurogenic claudication        Past Surgical History:   Procedure Laterality Date   • CATARACT EXTRACTION WITH INTRAOCULAR LENS IMPLANT     • FOOT SURGERY Right 2011    CRUSH INJURY   • INNER EAR SURGERY  1990    Ear Drum repair   • KNEE SURGERY  1985    cyst removal   • LEG DEBRIDEMENT Right 2011   • NECK DISSECTION  2015    LYMPH NODES REMOVED WHEN HAD TONSILAR CANCER   • TONSILLECTOMY  2015       Social History     Socioeconomic History   • Marital status:      Spouse name: Not on file   • Number of children: Not on file   • Years of education: Not on file   • Highest education level: Not on file   Social Needs   • Financial resource strain: Not on file   • Food insecurity - worry: Not on file   • Food insecurity - inability: Not on file   • Transportation needs - medical: Not on file   • Transportation needs - non-medical: Not on file   Occupational History   • Occupation: farmer   Tobacco Use   • Smoking status: Current Every Day Smoker     Packs/day: 1.00     Years: 25.00     Pack years: 25.00     Types: Cigarettes   • Smokeless tobacco: Never Used   • Tobacco comment: caff use   Substance and Sexual Activity   • Alcohol use: Yes     Comment: SOCIALLY   • Drug use: No   • Sexual activity: Defer   Other Topics Concern   • Not on file   Social History Narrative   • Not on file       Review of Systems   Constitution: Negative for chills, decreased appetite, fever and night sweats.   HENT: Negative for ear discharge, ear pain, hearing loss, nosebleeds and sore throat.    Eyes: Negative for blurred vision, " "double vision and pain.   Cardiovascular: Negative for cyanosis.   Respiratory: Negative for hemoptysis and sputum production.    Endocrine: Negative for cold intolerance and heat intolerance.   Hematologic/Lymphatic: Negative for adenopathy.   Skin: Negative for dry skin, itching, nail changes, rash and suspicious lesions.   Musculoskeletal: Negative for arthritis, gout, muscle cramps, muscle weakness, myalgias and neck pain.   Gastrointestinal: Negative for anorexia, bowel incontinence, constipation, diarrhea, dysphagia, hematemesis and jaundice.   Genitourinary: Negative for bladder incontinence, dysuria, flank pain, frequency, hematuria and nocturia.   Neurological: Negative for focal weakness, numbness, paresthesias and seizures.   Psychiatric/Behavioral: Negative for altered mental status, hallucinations, hypervigilance, suicidal ideas and thoughts of violence.   Allergic/Immunologic: Negative for persistent infections.         ECG 12 Lead  Date/Time: 11/27/2018 8:54 AM  Performed by: Romeo North III, MD  Authorized by: Romeo North III, MD   Comparison: compared with previous ECG   Similar to previous ECG  Rhythm: sinus rhythm  Rate: tachycardic  Conduction: incomplete RBBB  ST Segments: ST segments normal  T depression: V1  T flattening: aVL and V2  QRS axis: normal  Other findings: PRWP  Clinical impression: abnormal ECG               Objective:     Vitals:    11/27/18 0048 11/27/18 0842   BP:  122/80   Pulse: 94 (!) 127   Weight:  93 kg (205 lb)   Height:  167.6 cm (66\")         Physical Exam   Constitutional: He is oriented to person, place, and time. He appears well-developed and well-nourished. No distress.   HENT:   Head: Normocephalic and atraumatic.   Nose: Nose normal.   Mouth/Throat: Oropharynx is clear and moist.   Eyes: Conjunctivae and EOM are normal. Pupils are equal, round, and reactive to light. Right eye exhibits no discharge. Left eye exhibits no discharge.   Neck: Normal range of " motion. Neck supple. No tracheal deviation present. No thyromegaly present.   Cardiovascular: Normal rate, regular rhythm, S1 normal, S2 normal, normal heart sounds and normal pulses. Exam reveals no S3.   Pulmonary/Chest: Effort normal and breath sounds normal. No stridor. No respiratory distress. He exhibits no tenderness.   Abdominal: Soft. Bowel sounds are normal. He exhibits no distension and no mass. There is no tenderness. There is no rebound and no guarding.   Musculoskeletal: Normal range of motion. He exhibits no tenderness or deformity.   Lymphadenopathy:     He has no cervical adenopathy.   Neurological: He is alert and oriented to person, place, and time. He has normal reflexes.   Skin: Skin is warm and dry. No rash noted. He is not diaphoretic. No erythema.   Psychiatric: He has a normal mood and affect. Thought content normal.       Lab Review:             Performed  Records from [facility] are obtained and reviewed.    Laboratory data from [facility] is reviewed.    MRI from [facility] is reviewed and interpreted.  Abdominal aorta is normal.  Because of his dyspnea and tachycardia we performed an echocardiogram:  Interpretation Summary     · Left ventricular systolic function is normal. Estimated EF = 69%.                Assessment:          Diagnosis Plan   1. Preop cardiovascular exam  ECG 12 Lead    Adult Transthoracic Echo Complete W/ Cont if Necessary Per Protocol   2. Hypertension, unspecified type  Adult Transthoracic Echo Complete W/ Cont if Necessary Per Protocol   3. SOB (shortness of breath)  Adult Transthoracic Echo Complete W/ Cont if Necessary Per Protocol   4. Tachycardia  Adult Transthoracic Echo Complete W/ Cont if Necessary Per Protocol   5. Abnormal ECG  Adult Transthoracic Echo Complete W/ Cont if Necessary Per Protocol          Plan:       1.  Preoperative cardiovascular exam: This patient is at low cardiac risk for the anticipated surgical procedure.  He has a estimated risk  probability for perioperative myocardial infarction or cardiac arrest 0.01% ([place]).  2.  Essential hypertension-good control, continue current medical regimen  3.  Shortness of breath-normal echocardiogram  4.  Sinus tachycardia-normal echocardiogram, heart rate slowed after he was sitting down for a few minutes in the office.    Thank you very much for allowing us to participate in the care of this pleasant patient.  Please don't hesitate to call if I can be of assistance in any way.      Current Outpatient Medications:   •  acetaminophen (TYLENOL) 325 MG tablet, Take 650 mg by mouth Every 6 (Six) Hours As Needed for Mild Pain ., Disp: , Rfl:   •  cetirizine (zyrTEC) 10 MG tablet, Take 10 mg by mouth Daily., Disp: , Rfl:   •  DiphenhydrAMINE HCl (BENADRYL ALLERGY PO), Take 25 mg by mouth Every Evening., Disp: , Rfl:   •  fluticasone (FLONASE) 50 MCG/ACT nasal spray, 2 sprays into the nostril(s) as directed by provider Daily., Disp: , Rfl:   •  gabapentin (NEURONTIN) 300 MG capsule, Take 600 mg by mouth Every Night. Two capsules at bedtime , Disp: , Rfl:   •  lisinopril (PRINIVIL,ZESTRIL) 10 MG tablet, Take 10 mg by mouth Daily., Disp: , Rfl:   •  methocarbamol (ROBAXIN) 750 MG tablet, Take 750 mg by mouth Every Night., Disp: , Rfl:   •  Multiple Vitamins-Minerals (MULTIVITAMIN ADULT PO), Take 1 tablet by mouth Daily., Disp: , Rfl:   •  naproxen (NAPROSYN) 500 MG tablet, Take 500 mg by mouth Daily. HOLD PER MD INSTRUCTIONS, Disp: , Rfl:   •  omeprazole (priLOSEC) 20 MG capsule, Take 20 mg by mouth Daily., Disp: , Rfl:   •  pravastatin (PRAVACHOL) 10 MG tablet, Take 10 mg by mouth Daily., Disp: , Rfl:   •  tamsulosin (FLOMAX) 0.4 MG capsule 24 hr capsule, Take 1 capsule by mouth Daily., Disp: , Rfl:          EMR Dragon/Transcription disclaimer:    Much of this encounter note is an electronic transcription/translation of spoken language to printed text. The electronic translation of spoken language may permit  erroneous, or at times, nonsensical words or phrases to be inadvertently transcribed; Although I have reviewed the note for such errors, some may still exist.

## 2018-12-13 ENCOUNTER — ANESTHESIA EVENT (OUTPATIENT)
Dept: PERIOP | Facility: HOSPITAL | Age: 68
End: 2018-12-13

## 2018-12-13 ENCOUNTER — APPOINTMENT (OUTPATIENT)
Dept: GENERAL RADIOLOGY | Facility: HOSPITAL | Age: 68
End: 2018-12-13

## 2018-12-13 ENCOUNTER — ANESTHESIA (OUTPATIENT)
Dept: PERIOP | Facility: HOSPITAL | Age: 68
End: 2018-12-13

## 2018-12-13 ENCOUNTER — HOSPITAL ENCOUNTER (INPATIENT)
Facility: HOSPITAL | Age: 68
LOS: 1 days | Discharge: HOME | End: 2018-12-14
Attending: NEUROLOGICAL SURGERY | Admitting: NEUROLOGICAL SURGERY

## 2018-12-13 VITALS
RESPIRATION RATE: 16 BRPM | SYSTOLIC BLOOD PRESSURE: 129 MMHG | OXYGEN SATURATION: 97 % | HEIGHT: 67 IN | HEART RATE: 89 BPM | TEMPERATURE: 97.7 F | DIASTOLIC BLOOD PRESSURE: 85 MMHG | WEIGHT: 197.06 LBS | BODY MASS INDEX: 30.93 KG/M2

## 2018-12-13 DIAGNOSIS — R26.2 DIFFICULTY WALKING: Primary | ICD-10-CM

## 2018-12-13 DIAGNOSIS — M54.16 LUMBAR RADICULOPATHY: ICD-10-CM

## 2018-12-13 DIAGNOSIS — M48.062 SPINAL STENOSIS, LUMBAR REGION, WITH NEUROGENIC CLAUDICATION: ICD-10-CM

## 2018-12-13 PROBLEM — G89.29 CHRONIC RIGHT-SIDED LOW BACK PAIN WITH LEFT-SIDED SCIATICA: Status: ACTIVE | Noted: 2018-12-13

## 2018-12-13 PROBLEM — M54.42 CHRONIC RIGHT-SIDED LOW BACK PAIN WITH LEFT-SIDED SCIATICA: Status: ACTIVE | Noted: 2018-12-13

## 2018-12-13 LAB — GLUCOSE BLDC GLUCOMTR-MCNC: 110 MG/DL (ref 70–130)

## 2018-12-13 PROCEDURE — 82962 GLUCOSE BLOOD TEST: CPT

## 2018-12-13 PROCEDURE — 25010000002 DEXAMETHASONE PER 1 MG: Performed by: NURSE ANESTHETIST, CERTIFIED REGISTERED

## 2018-12-13 PROCEDURE — 25010000002 FENTANYL CITRATE (PF) 100 MCG/2ML SOLUTION: Performed by: ANESTHESIOLOGY

## 2018-12-13 PROCEDURE — C1769 GUIDE WIRE: HCPCS | Performed by: NEUROLOGICAL SURGERY

## 2018-12-13 PROCEDURE — C1713 ANCHOR/SCREW BN/BN,TIS/BN: HCPCS | Performed by: NEUROLOGICAL SURGERY

## 2018-12-13 PROCEDURE — 22558 ARTHRD ANT NTRBD MIN DSC LUM: CPT | Performed by: NEUROLOGICAL SURGERY

## 2018-12-13 PROCEDURE — 25010000002 FENTANYL CITRATE (PF) 100 MCG/2ML SOLUTION: Performed by: NURSE ANESTHETIST, CERTIFIED REGISTERED

## 2018-12-13 PROCEDURE — 25010000002 ONDANSETRON PER 1 MG: Performed by: NURSE ANESTHETIST, CERTIFIED REGISTERED

## 2018-12-13 PROCEDURE — 25010000002 PROPOFOL 10 MG/ML EMULSION: Performed by: NURSE ANESTHETIST, CERTIFIED REGISTERED

## 2018-12-13 PROCEDURE — 94799 UNLISTED PULMONARY SVC/PX: CPT

## 2018-12-13 PROCEDURE — 63710000001 DIPHENHYDRAMINE PER 50 MG: Performed by: NURSE PRACTITIONER

## 2018-12-13 PROCEDURE — 0SB20ZZ EXCISION OF LUMBAR VERTEBRAL DISC, OPEN APPROACH: ICD-10-PCS | Performed by: NEUROLOGICAL SURGERY

## 2018-12-13 PROCEDURE — 25010000003 CEFAZOLIN IN DEXTROSE 2-4 GM/100ML-% SOLUTION: Performed by: NEUROLOGICAL SURGERY

## 2018-12-13 PROCEDURE — 25010000002 SUCCINYLCHOLINE PER 20 MG: Performed by: NURSE ANESTHETIST, CERTIFIED REGISTERED

## 2018-12-13 PROCEDURE — 25010000002 PHENYLEPHRINE PER 1 ML: Performed by: NURSE ANESTHETIST, CERTIFIED REGISTERED

## 2018-12-13 PROCEDURE — 72100 X-RAY EXAM L-S SPINE 2/3 VWS: CPT

## 2018-12-13 PROCEDURE — 25010000002 HYDROMORPHONE PER 4 MG: Performed by: NURSE ANESTHETIST, CERTIFIED REGISTERED

## 2018-12-13 PROCEDURE — 22840 INSERT SPINE FIXATION DEVICE: CPT | Performed by: NEUROLOGICAL SURGERY

## 2018-12-13 PROCEDURE — 61783 SCAN PROC SPINAL: CPT | Performed by: NEUROLOGICAL SURGERY

## 2018-12-13 PROCEDURE — 25010000002 DEXAMETHASONE PER 1 MG: Performed by: NEUROLOGICAL SURGERY

## 2018-12-13 PROCEDURE — 25010000002 ONDANSETRON PER 1 MG: Performed by: NEUROLOGICAL SURGERY

## 2018-12-13 PROCEDURE — 0SG00A0 FUSION OF LUMBAR VERTEBRAL JOINT WITH INTERBODY FUSION DEVICE, ANTERIOR APPROACH, ANTERIOR COLUMN, OPEN APPROACH: ICD-10-PCS | Performed by: NEUROLOGICAL SURGERY

## 2018-12-13 PROCEDURE — 22853 INSJ BIOMECHANICAL DEVICE: CPT | Performed by: NEUROLOGICAL SURGERY

## 2018-12-13 PROCEDURE — 4A11X4G MONITORING OF PERIPHERAL NERVOUS ELECTRICAL ACTIVITY, INTRAOPERATIVE, EXTERNAL APPROACH: ICD-10-PCS | Performed by: NEUROLOGICAL SURGERY

## 2018-12-13 PROCEDURE — 25010000002 MIDAZOLAM PER 1 MG: Performed by: ANESTHESIOLOGY

## 2018-12-13 PROCEDURE — 76000 FLUOROSCOPY <1 HR PHYS/QHP: CPT

## 2018-12-13 DEVICE — ROD 1475005040 4.75 CCM SEXTANT 40MM
Type: IMPLANTABLE DEVICE | Site: SPINE LUMBAR | Status: FUNCTIONAL
Brand: CD HORIZON® SPINAL SYSTEM

## 2018-12-13 DEVICE — CAGE 4986055 CDALE PTC 18MM 6 DEG 10X55
Type: IMPLANTABLE DEVICE | Site: SPINE LUMBAR | Status: FUNCTIONAL
Brand: CLYDESDALE PTC™ SPINAL SYSTEM

## 2018-12-13 DEVICE — WAX BONE HEMO NAT 2.5G: Type: IMPLANTABLE DEVICE | Site: SPINE LUMBAR | Status: FUNCTIONAL

## 2018-12-13 DEVICE — DBM T44150 10CC ORTHOBLEND SMALL DEFGRAF
Type: IMPLANTABLE DEVICE | Site: SPINE LUMBAR | Status: FUNCTIONAL
Brand: GRAFTON®AND GRAFTON PLUS®DEMINERALIZED BONE MATRIX (DBM)

## 2018-12-13 RX ORDER — SODIUM CHLORIDE, SODIUM LACTATE, POTASSIUM CHLORIDE, CALCIUM CHLORIDE 600; 310; 30; 20 MG/100ML; MG/100ML; MG/100ML; MG/100ML
9 INJECTION, SOLUTION INTRAVENOUS CONTINUOUS
Status: DISCONTINUED | OUTPATIENT
Start: 2018-12-13 | End: 2018-12-14 | Stop reason: HOSPADM

## 2018-12-13 RX ORDER — GABAPENTIN 300 MG/1
600 CAPSULE ORAL NIGHTLY
Status: DISCONTINUED | OUTPATIENT
Start: 2018-12-13 | End: 2018-12-14 | Stop reason: HOSPADM

## 2018-12-13 RX ORDER — SODIUM CHLORIDE, SODIUM LACTATE, POTASSIUM CHLORIDE, CALCIUM CHLORIDE 600; 310; 30; 20 MG/100ML; MG/100ML; MG/100ML; MG/100ML
75 INJECTION, SOLUTION INTRAVENOUS CONTINUOUS
Status: DISCONTINUED | OUTPATIENT
Start: 2018-12-13 | End: 2018-12-14 | Stop reason: HOSPADM

## 2018-12-13 RX ORDER — FENTANYL CITRATE 50 UG/ML
INJECTION, SOLUTION INTRAMUSCULAR; INTRAVENOUS AS NEEDED
Status: DISCONTINUED | OUTPATIENT
Start: 2018-12-13 | End: 2018-12-13 | Stop reason: SURG

## 2018-12-13 RX ORDER — ONDANSETRON 2 MG/ML
4 INJECTION INTRAMUSCULAR; INTRAVENOUS ONCE AS NEEDED
Status: DISCONTINUED | OUTPATIENT
Start: 2018-12-13 | End: 2018-12-13 | Stop reason: HOSPADM

## 2018-12-13 RX ORDER — FLUTICASONE PROPIONATE 50 MCG
2 SPRAY, SUSPENSION (ML) NASAL DAILY
Status: DISCONTINUED | OUTPATIENT
Start: 2018-12-13 | End: 2018-12-14 | Stop reason: HOSPADM

## 2018-12-13 RX ORDER — DIPHENHYDRAMINE HCL 25 MG
25 CAPSULE ORAL EVERY 6 HOURS PRN
Status: CANCELLED | OUTPATIENT
Start: 2018-12-13

## 2018-12-13 RX ORDER — DIPHENHYDRAMINE HCL 25 MG
25 CAPSULE ORAL
Status: DISCONTINUED | OUTPATIENT
Start: 2018-12-13 | End: 2018-12-13 | Stop reason: HOSPADM

## 2018-12-13 RX ORDER — FAMOTIDINE 10 MG/ML
20 INJECTION, SOLUTION INTRAVENOUS ONCE
Status: COMPLETED | OUTPATIENT
Start: 2018-12-13 | End: 2018-12-13

## 2018-12-13 RX ORDER — CEFAZOLIN SODIUM 2 G/100ML
2 INJECTION, SOLUTION INTRAVENOUS ONCE
Status: COMPLETED | OUTPATIENT
Start: 2018-12-13 | End: 2018-12-13

## 2018-12-13 RX ORDER — MIDAZOLAM HYDROCHLORIDE 1 MG/ML
2 INJECTION INTRAMUSCULAR; INTRAVENOUS
Status: DISCONTINUED | OUTPATIENT
Start: 2018-12-13 | End: 2018-12-13 | Stop reason: HOSPADM

## 2018-12-13 RX ORDER — SODIUM CHLORIDE 9 MG/ML
INJECTION, SOLUTION INTRAVENOUS CONTINUOUS PRN
Status: DISCONTINUED | OUTPATIENT
Start: 2018-12-13 | End: 2018-12-13 | Stop reason: SURG

## 2018-12-13 RX ORDER — PROPOFOL 10 MG/ML
VIAL (ML) INTRAVENOUS AS NEEDED
Status: DISCONTINUED | OUTPATIENT
Start: 2018-12-13 | End: 2018-12-13 | Stop reason: SURG

## 2018-12-13 RX ORDER — PROMETHAZINE HYDROCHLORIDE 25 MG/ML
12.5 INJECTION, SOLUTION INTRAMUSCULAR; INTRAVENOUS ONCE AS NEEDED
Status: DISCONTINUED | OUTPATIENT
Start: 2018-12-13 | End: 2018-12-13 | Stop reason: HOSPADM

## 2018-12-13 RX ORDER — ONDANSETRON 4 MG/1
4 TABLET, FILM COATED ORAL EVERY 6 HOURS PRN
Status: DISCONTINUED | OUTPATIENT
Start: 2018-12-13 | End: 2018-12-14 | Stop reason: HOSPADM

## 2018-12-13 RX ORDER — MAGNESIUM HYDROXIDE 1200 MG/15ML
LIQUID ORAL AS NEEDED
Status: DISCONTINUED | OUTPATIENT
Start: 2018-12-13 | End: 2018-12-13 | Stop reason: HOSPADM

## 2018-12-13 RX ORDER — HYDROCODONE BITARTRATE AND ACETAMINOPHEN 5; 325 MG/1; MG/1
1 TABLET ORAL EVERY 4 HOURS PRN
Status: DISCONTINUED | OUTPATIENT
Start: 2018-12-13 | End: 2018-12-14 | Stop reason: HOSPADM

## 2018-12-13 RX ORDER — DIPHENHYDRAMINE HCL 25 MG
25 CAPSULE ORAL EVERY EVENING
Status: DISCONTINUED | OUTPATIENT
Start: 2018-12-13 | End: 2018-12-13

## 2018-12-13 RX ORDER — DEXAMETHASONE SODIUM PHOSPHATE 4 MG/ML
4 INJECTION, SOLUTION INTRA-ARTICULAR; INTRALESIONAL; INTRAMUSCULAR; INTRAVENOUS; SOFT TISSUE EVERY 6 HOURS
Status: COMPLETED | OUTPATIENT
Start: 2018-12-13 | End: 2018-12-13

## 2018-12-13 RX ORDER — METHOCARBAMOL 750 MG/1
750 TABLET, FILM COATED ORAL NIGHTLY
Status: DISCONTINUED | OUTPATIENT
Start: 2018-12-13 | End: 2018-12-14 | Stop reason: HOSPADM

## 2018-12-13 RX ORDER — LIDOCAINE HYDROCHLORIDE 20 MG/ML
INJECTION, SOLUTION INFILTRATION; PERINEURAL AS NEEDED
Status: DISCONTINUED | OUTPATIENT
Start: 2018-12-13 | End: 2018-12-13 | Stop reason: SURG

## 2018-12-13 RX ORDER — EPHEDRINE SULFATE 50 MG/ML
INJECTION, SOLUTION INTRAVENOUS AS NEEDED
Status: DISCONTINUED | OUTPATIENT
Start: 2018-12-13 | End: 2018-12-13 | Stop reason: SURG

## 2018-12-13 RX ORDER — HYDROCODONE BITARTRATE AND ACETAMINOPHEN 10; 325 MG/1; MG/1
1 TABLET ORAL EVERY 4 HOURS PRN
Status: DISCONTINUED | OUTPATIENT
Start: 2018-12-13 | End: 2018-12-14 | Stop reason: HOSPADM

## 2018-12-13 RX ORDER — FENTANYL CITRATE 50 UG/ML
50 INJECTION, SOLUTION INTRAMUSCULAR; INTRAVENOUS
Status: DISCONTINUED | OUTPATIENT
Start: 2018-12-13 | End: 2018-12-13 | Stop reason: HOSPADM

## 2018-12-13 RX ORDER — HYDROCODONE BITARTRATE AND ACETAMINOPHEN 10; 325 MG/1; MG/1
TABLET ORAL
Status: COMPLETED
Start: 2018-12-13 | End: 2018-12-13

## 2018-12-13 RX ORDER — CEFAZOLIN SODIUM 2 G/100ML
2 INJECTION, SOLUTION INTRAVENOUS EVERY 8 HOURS
Status: COMPLETED | OUTPATIENT
Start: 2018-12-13 | End: 2018-12-14

## 2018-12-13 RX ORDER — SODIUM CHLORIDE 0.9 % (FLUSH) 0.9 %
3-10 SYRINGE (ML) INJECTION AS NEEDED
Status: DISCONTINUED | OUTPATIENT
Start: 2018-12-13 | End: 2018-12-13 | Stop reason: HOSPADM

## 2018-12-13 RX ORDER — ACETAMINOPHEN 325 MG/1
650 TABLET ORAL EVERY 6 HOURS PRN
Status: DISCONTINUED | OUTPATIENT
Start: 2018-12-13 | End: 2018-12-14 | Stop reason: HOSPADM

## 2018-12-13 RX ORDER — TAMSULOSIN HYDROCHLORIDE 0.4 MG/1
0.4 CAPSULE ORAL DAILY
Status: DISCONTINUED | OUTPATIENT
Start: 2018-12-13 | End: 2018-12-14 | Stop reason: HOSPADM

## 2018-12-13 RX ORDER — ONDANSETRON 4 MG/1
4 TABLET, ORALLY DISINTEGRATING ORAL EVERY 6 HOURS PRN
Status: DISCONTINUED | OUTPATIENT
Start: 2018-12-13 | End: 2018-12-14 | Stop reason: HOSPADM

## 2018-12-13 RX ORDER — NALOXONE HCL 0.4 MG/ML
0.2 VIAL (ML) INJECTION AS NEEDED
Status: DISCONTINUED | OUTPATIENT
Start: 2018-12-13 | End: 2018-12-13 | Stop reason: HOSPADM

## 2018-12-13 RX ORDER — SODIUM CHLORIDE, SODIUM LACTATE, POTASSIUM CHLORIDE, CALCIUM CHLORIDE 600; 310; 30; 20 MG/100ML; MG/100ML; MG/100ML; MG/100ML
100 INJECTION, SOLUTION INTRAVENOUS CONTINUOUS
Status: DISCONTINUED | OUTPATIENT
Start: 2018-12-13 | End: 2018-12-14 | Stop reason: HOSPADM

## 2018-12-13 RX ORDER — SODIUM CHLORIDE 0.9 % (FLUSH) 0.9 %
3 SYRINGE (ML) INJECTION EVERY 12 HOURS SCHEDULED
Status: DISCONTINUED | OUTPATIENT
Start: 2018-12-13 | End: 2018-12-13 | Stop reason: HOSPADM

## 2018-12-13 RX ORDER — HYDROMORPHONE HYDROCHLORIDE 1 MG/ML
0.5 INJECTION, SOLUTION INTRAMUSCULAR; INTRAVENOUS; SUBCUTANEOUS
Status: DISCONTINUED | OUTPATIENT
Start: 2018-12-13 | End: 2018-12-13 | Stop reason: HOSPADM

## 2018-12-13 RX ORDER — DEXAMETHASONE SODIUM PHOSPHATE 4 MG/ML
INJECTION, SOLUTION INTRA-ARTICULAR; INTRALESIONAL; INTRAMUSCULAR; INTRAVENOUS; SOFT TISSUE AS NEEDED
Status: DISCONTINUED | OUTPATIENT
Start: 2018-12-13 | End: 2018-12-13 | Stop reason: SURG

## 2018-12-13 RX ORDER — DOCUSATE SODIUM 100 MG/1
100 CAPSULE, LIQUID FILLED ORAL 2 TIMES DAILY PRN
Status: DISCONTINUED | OUTPATIENT
Start: 2018-12-13 | End: 2018-12-14 | Stop reason: HOSPADM

## 2018-12-13 RX ORDER — PROMETHAZINE HYDROCHLORIDE 25 MG/1
25 SUPPOSITORY RECTAL ONCE AS NEEDED
Status: DISCONTINUED | OUTPATIENT
Start: 2018-12-13 | End: 2018-12-13 | Stop reason: HOSPADM

## 2018-12-13 RX ORDER — BISACODYL 5 MG/1
10 TABLET, DELAYED RELEASE ORAL DAILY PRN
Status: DISCONTINUED | OUTPATIENT
Start: 2018-12-13 | End: 2018-12-14 | Stop reason: HOSPADM

## 2018-12-13 RX ORDER — MIDAZOLAM HYDROCHLORIDE 1 MG/ML
1 INJECTION INTRAMUSCULAR; INTRAVENOUS
Status: DISCONTINUED | OUTPATIENT
Start: 2018-12-13 | End: 2018-12-13 | Stop reason: HOSPADM

## 2018-12-13 RX ORDER — ONDANSETRON 2 MG/ML
4 INJECTION INTRAMUSCULAR; INTRAVENOUS EVERY 6 HOURS PRN
Status: DISCONTINUED | OUTPATIENT
Start: 2018-12-13 | End: 2018-12-14 | Stop reason: HOSPADM

## 2018-12-13 RX ORDER — OXYCODONE HYDROCHLORIDE 5 MG/1
10 TABLET ORAL EVERY 4 HOURS PRN
Status: DISCONTINUED | OUTPATIENT
Start: 2018-12-13 | End: 2018-12-14 | Stop reason: HOSPADM

## 2018-12-13 RX ORDER — ATORVASTATIN CALCIUM 10 MG/1
10 TABLET, FILM COATED ORAL DAILY
Status: DISCONTINUED | OUTPATIENT
Start: 2018-12-13 | End: 2018-12-14 | Stop reason: HOSPADM

## 2018-12-13 RX ORDER — HYDROMORPHONE HCL 110MG/55ML
PATIENT CONTROLLED ANALGESIA SYRINGE INTRAVENOUS AS NEEDED
Status: DISCONTINUED | OUTPATIENT
Start: 2018-12-13 | End: 2018-12-13 | Stop reason: SURG

## 2018-12-13 RX ORDER — EPHEDRINE SULFATE 50 MG/ML
5 INJECTION, SOLUTION INTRAVENOUS ONCE AS NEEDED
Status: DISCONTINUED | OUTPATIENT
Start: 2018-12-13 | End: 2018-12-13 | Stop reason: HOSPADM

## 2018-12-13 RX ORDER — FLUMAZENIL 0.1 MG/ML
0.2 INJECTION INTRAVENOUS AS NEEDED
Status: DISCONTINUED | OUTPATIENT
Start: 2018-12-13 | End: 2018-12-13 | Stop reason: HOSPADM

## 2018-12-13 RX ORDER — SUCCINYLCHOLINE CHLORIDE 20 MG/ML
INJECTION INTRAMUSCULAR; INTRAVENOUS AS NEEDED
Status: DISCONTINUED | OUTPATIENT
Start: 2018-12-13 | End: 2018-12-13 | Stop reason: SURG

## 2018-12-13 RX ORDER — CETIRIZINE HYDROCHLORIDE 10 MG/1
10 TABLET ORAL DAILY
Status: DISCONTINUED | OUTPATIENT
Start: 2018-12-13 | End: 2018-12-14 | Stop reason: HOSPADM

## 2018-12-13 RX ORDER — ONDANSETRON 2 MG/ML
INJECTION INTRAMUSCULAR; INTRAVENOUS AS NEEDED
Status: DISCONTINUED | OUTPATIENT
Start: 2018-12-13 | End: 2018-12-13 | Stop reason: SURG

## 2018-12-13 RX ORDER — PROMETHAZINE HYDROCHLORIDE 25 MG/1
25 TABLET ORAL ONCE AS NEEDED
Status: DISCONTINUED | OUTPATIENT
Start: 2018-12-13 | End: 2018-12-13 | Stop reason: HOSPADM

## 2018-12-13 RX ORDER — LISINOPRIL 10 MG/1
10 TABLET ORAL DAILY
Status: DISCONTINUED | OUTPATIENT
Start: 2018-12-13 | End: 2018-12-14 | Stop reason: HOSPADM

## 2018-12-13 RX ORDER — DIPHENHYDRAMINE HCL 25 MG
25 CAPSULE ORAL EVERY 6 HOURS PRN
Status: DISCONTINUED | OUTPATIENT
Start: 2018-12-13 | End: 2018-12-14 | Stop reason: HOSPADM

## 2018-12-13 RX ORDER — PANTOPRAZOLE SODIUM 40 MG/1
40 TABLET, DELAYED RELEASE ORAL EVERY MORNING
Status: DISCONTINUED | OUTPATIENT
Start: 2018-12-14 | End: 2018-12-14 | Stop reason: HOSPADM

## 2018-12-13 RX ADMIN — HYDROMORPHONE HYDROCHLORIDE 0.5 MG: 1 INJECTION, SOLUTION INTRAMUSCULAR; INTRAVENOUS; SUBCUTANEOUS at 13:38

## 2018-12-13 RX ADMIN — METHOCARBAMOL TABLETS 750 MG: 750 TABLET, COATED ORAL at 22:21

## 2018-12-13 RX ADMIN — HYDROCODONE BITARTRATE AND ACETAMINOPHEN 1 TABLET: 10; 325 TABLET ORAL at 13:34

## 2018-12-13 RX ADMIN — SODIUM CHLORIDE, POTASSIUM CHLORIDE, SODIUM LACTATE AND CALCIUM CHLORIDE 75 ML/HR: 600; 310; 30; 20 INJECTION, SOLUTION INTRAVENOUS at 13:11

## 2018-12-13 RX ADMIN — SODIUM CHLORIDE: 9 INJECTION, SOLUTION INTRAVENOUS at 09:21

## 2018-12-13 RX ADMIN — HYDROMORPHONE HYDROCHLORIDE 0.5 MG: 2 INJECTION INTRAMUSCULAR; INTRAVENOUS; SUBCUTANEOUS at 11:28

## 2018-12-13 RX ADMIN — HYDROCODONE BITARTRATE AND ACETAMINOPHEN 1 TABLET: 10; 325 TABLET ORAL at 18:23

## 2018-12-13 RX ADMIN — HYDROMORPHONE HYDROCHLORIDE 0.5 MG: 2 INJECTION INTRAMUSCULAR; INTRAVENOUS; SUBCUTANEOUS at 11:23

## 2018-12-13 RX ADMIN — MIDAZOLAM HYDROCHLORIDE 1 MG: 2 INJECTION, SOLUTION INTRAMUSCULAR; INTRAVENOUS at 07:29

## 2018-12-13 RX ADMIN — FENTANYL CITRATE 100 MCG: 50 INJECTION, SOLUTION INTRAMUSCULAR; INTRAVENOUS at 08:11

## 2018-12-13 RX ADMIN — DIPHENHYDRAMINE HYDROCHLORIDE 25 MG: 25 CAPSULE ORAL at 16:17

## 2018-12-13 RX ADMIN — SODIUM CHLORIDE, POTASSIUM CHLORIDE, SODIUM LACTATE AND CALCIUM CHLORIDE 100 ML/HR: 600; 310; 30; 20 INJECTION, SOLUTION INTRAVENOUS at 06:32

## 2018-12-13 RX ADMIN — FENTANYL CITRATE 50 MCG: 50 INJECTION, SOLUTION INTRAMUSCULAR; INTRAVENOUS at 07:30

## 2018-12-13 RX ADMIN — CEFAZOLIN SODIUM 2 G: 2 INJECTION, SOLUTION INTRAVENOUS at 11:55

## 2018-12-13 RX ADMIN — DEXAMETHASONE SODIUM PHOSPHATE 12 MG: 4 INJECTION INTRA-ARTICULAR; INTRALESIONAL; INTRAMUSCULAR; INTRAVENOUS; SOFT TISSUE at 08:21

## 2018-12-13 RX ADMIN — EPHEDRINE SULFATE 5 MG: 50 INJECTION INTRAMUSCULAR; INTRAVENOUS; SUBCUTANEOUS at 09:30

## 2018-12-13 RX ADMIN — LIDOCAINE HYDROCHLORIDE 100 MG: 20 INJECTION, SOLUTION INFILTRATION; PERINEURAL at 08:15

## 2018-12-13 RX ADMIN — DEXAMETHASONE SODIUM PHOSPHATE 4 MG: 4 INJECTION, SOLUTION INTRAMUSCULAR; INTRAVENOUS at 06:32

## 2018-12-13 RX ADMIN — PHENYLEPHRINE HYDROCHLORIDE 200 MCG: 10 INJECTION INTRAVENOUS at 09:19

## 2018-12-13 RX ADMIN — EPHEDRINE SULFATE 5 MG: 50 INJECTION INTRAMUSCULAR; INTRAVENOUS; SUBCUTANEOUS at 09:35

## 2018-12-13 RX ADMIN — SUCCINYLCHOLINE CHLORIDE 200 MG: 20 INJECTION, SOLUTION INTRAMUSCULAR; INTRAVENOUS; PARENTERAL at 08:16

## 2018-12-13 RX ADMIN — FENTANYL CITRATE 50 MCG: 50 INJECTION, SOLUTION INTRAMUSCULAR; INTRAVENOUS at 13:13

## 2018-12-13 RX ADMIN — HYDROMORPHONE HYDROCHLORIDE 0.5 MG: 1 INJECTION, SOLUTION INTRAMUSCULAR; INTRAVENOUS; SUBCUTANEOUS at 13:12

## 2018-12-13 RX ADMIN — HYDROMORPHONE HYDROCHLORIDE 0.5 MG: 1 INJECTION, SOLUTION INTRAMUSCULAR; INTRAVENOUS; SUBCUTANEOUS at 13:24

## 2018-12-13 RX ADMIN — FENTANYL CITRATE 50 MCG: 50 INJECTION, SOLUTION INTRAMUSCULAR; INTRAVENOUS at 10:14

## 2018-12-13 RX ADMIN — CEFAZOLIN SODIUM 2 G: 2 INJECTION, SOLUTION INTRAVENOUS at 17:24

## 2018-12-13 RX ADMIN — FENTANYL CITRATE 50 MCG: 50 INJECTION, SOLUTION INTRAMUSCULAR; INTRAVENOUS at 10:06

## 2018-12-13 RX ADMIN — FAMOTIDINE 20 MG: 10 INJECTION, SOLUTION INTRAVENOUS at 07:29

## 2018-12-13 RX ADMIN — CEFAZOLIN SODIUM 2 G: 2 INJECTION, SOLUTION INTRAVENOUS at 08:06

## 2018-12-13 RX ADMIN — FENTANYL CITRATE 50 MCG: 50 INJECTION, SOLUTION INTRAMUSCULAR; INTRAVENOUS at 13:23

## 2018-12-13 RX ADMIN — ONDANSETRON 4 MG: 2 INJECTION INTRAMUSCULAR; INTRAVENOUS at 16:24

## 2018-12-13 RX ADMIN — GABAPENTIN 600 MG: 300 CAPSULE ORAL at 22:22

## 2018-12-13 RX ADMIN — ONDANSETRON 4 MG: 2 INJECTION INTRAMUSCULAR; INTRAVENOUS at 11:21

## 2018-12-13 RX ADMIN — HYDROCODONE BITARTRATE AND ACETAMINOPHEN 1 TABLET: 10; 325 TABLET ORAL at 22:22

## 2018-12-13 RX ADMIN — PROPOFOL 150 MG: 10 INJECTION, EMULSION INTRAVENOUS at 08:15

## 2018-12-13 RX ADMIN — PHENYLEPHRINE HYDROCHLORIDE 100 MCG: 10 INJECTION INTRAVENOUS at 10:58

## 2018-12-13 RX ADMIN — PHENYLEPHRINE HYDROCHLORIDE 200 MCG: 10 INJECTION INTRAVENOUS at 09:10

## 2018-12-13 RX ADMIN — DOCUSATE SODIUM 100 MG: 100 CAPSULE, LIQUID FILLED ORAL at 22:22

## 2018-12-13 RX ADMIN — PHENYLEPHRINE HYDROCHLORIDE 200 MCG: 10 INJECTION INTRAVENOUS at 11:00

## 2018-12-13 RX ADMIN — FENTANYL CITRATE 50 MCG: 50 INJECTION, SOLUTION INTRAMUSCULAR; INTRAVENOUS at 08:43

## 2018-12-13 NOTE — ANESTHESIA POSTPROCEDURE EVALUATION
"Patient: Damion Elizabeth    Procedure Summary     Date:  12/13/18 Room / Location:  [facility] OR [address] [place] [address] MAIN OR    Anesthesia Start:  0806 Anesthesia Stop:  1234    Procedures:       Left L4/5 obique lateral interbody fusion and interbody instrumentation with posterior lateral arthrodesis and posterior instrumentation (Left Spine Lumbar)      ANTERIOR EXPOSURE LUMBAR SPINE (N/A ) Diagnosis:       Lumbar radiculopathy      Spinal stenosis, lumbar region, with neurogenic claudication      (Lumbar radiculopathy [M54.16])      (Spinal stenosis, lumbar region, with neurogenic claudication [M48.062])    Surgeon:  Chavez Verde MD; Giorgi Dia MD Provider:  Cristhian Landeros MD    Anesthesia Type:  general ASA Status:  3          Anesthesia Type: general  Last vitals  BP   124/82 (12/13/18 1330)   Temp   36.4 °C (97.5 °F) (12/13/18 1229)   Pulse   112 (12/13/18 1330)   Resp   14 (12/13/18 1330)     SpO2   97 % (12/13/18 1330)     Post Anesthesia Care and Evaluation    Patient location during evaluation: bedside  Patient participation: complete - patient participated  Level of consciousness: awake and alert  Pain management: adequate  Airway patency: patent  Anesthetic complications: No anesthetic complications    Cardiovascular status: acceptable  Respiratory status: acceptable  Hydration status: acceptable    Comments: /82   Pulse 112   Temp 36.4 °C (97.5 °F) (Oral)   Resp 14   Ht 168.9 cm (66.5\")   Wt 89.4 kg (197 lb 1 oz)   SpO2 97%   BMI 31.33 kg/m²           "

## 2018-12-13 NOTE — PLAN OF CARE
Problem: Patient Care Overview  Goal: Plan of Care Review  Outcome: Ongoing (interventions implemented as appropriate)   12/13/18 4202   Coping/Psychosocial   Plan of Care Reviewed With patient;spouse   Plan of Care Review   Progress no change   OTHER   Outcome Summary pt alert and oriented. VSS. up to hair with 1 assist. c/o n/v. zofran given as ordered. norco prn given for incisional pain. will continue to monitor.

## 2018-12-13 NOTE — ANESTHESIA PREPROCEDURE EVALUATION
Anesthesia Evaluation     Patient summary reviewed and Nursing notes reviewed   no history of anesthetic complications:  NPO Solid Status: > 6 hours  NPO Liquid Status: > 6 hours           Airway   Mallampati: III  TM distance: <3 FB  Neck ROM: limited  Possible difficult intubation  Dental - normal exam     Pulmonary - negative pulmonary ROS and normal exam    breath sounds clear to auscultation  (-) rhonchi, decreased breath sounds, wheezes, rales, stridor  Cardiovascular - normal exam    NYHA Classification: I  ECG reviewed  Rhythm: regular  Rate: normal    (+) hypertension, hyperlipidemia,   (-) murmur, weak pulses, friction rub, systolic click, carotid bruits, JVD, peripheral edema      Neuro/Psych  (+) numbness,     GI/Hepatic/Renal/Endo    (+) obesity,  GERD,      Musculoskeletal     (+) back pain,   Abdominal  - normal exam    Abdomen: soft.   Substance History - negative use     OB/GYN negative ob/gyn ROS         Other   (+) arthritis   history of cancer remission                    Anesthesia Plan    ASA 3     general     intravenous induction   Anesthetic plan, all risks, benefits, and alternatives have been provided, discussed and informed consent has been obtained with: patient.

## 2018-12-13 NOTE — BRIEF OP NOTE
L4/5 oblique lateral interbody fusion with posterior instrumentation.     Damion ZAC Glenn  12/13/2018    Pre-op Diagnosis:   Lumbar radiculopathy [M54.16]  Spinal stenosis, lumbar region, with neurogenic claudication [M48.062]       Post-Op Diagnosis Codes:     * Lumbar radiculopathy [M54.16]     * Spinal stenosis, lumbar region, with neurogenic claudication [M48.062]    Procedure/CPT® Codes:      Procedure(s):  Left L4/5 obique lateral interbody fusion and interbody instrumentation with posterior lateral arthrodesis and posterior instrumentation  ANTERIOR EXPOSURE LUMBAR SPINE    Surgeon(s):  Chavez Verde MD Scherrer, Noah T, MD    Anesthesia: General    Staff:   Circulator: Gifty Garrett RN; Coby Chavez RN  Radiology Technologist: Tangela Jordan  Scrub Person: Chrystal Barbosa RN  Vendor Representative: Celestino Lind  Assistant: Rosita Ordoñez CSA    Estimated Blood Loss: 200 mL    Urine Voided: 200 mL    Specimens:                None      Drains:   Urethral Catheter Silicone (Active)       Findings: Severe DDD    Complications: none      Chavez Verde MD     Date: 12/13/2018  Time: 11:16 AM

## 2018-12-13 NOTE — ANESTHESIA PROCEDURE NOTES
ANESTHESIA INTUBATION  Urgency: elective    Date/Time: 12/13/2018 8:18 AM  Difficult airway    General Information and Staff    Patient location during procedure: OR  Anesthesiologist: Cristhian Landeros MD  CRNA: Rylie Montoya CRNA    Indications and Patient Condition  Indications for airway management: airway protection    Preoxygenated: yes  Mask difficulty assessment: 3 - difficult mask (inadequate, unstable or two providers) +/- NMBA    Final Airway Details  Final airway type: endotracheal airway      Successful airway: ETT  Cuffed: yes   Successful intubation technique: direct laryngoscopy and video laryngoscopy  Facilitating devices/methods: intubating stylet  Endotracheal tube insertion site: oral  Blade: CMAC  Blade size: D  ETT size (mm): 7.5  Cormack-Lehane Classification: grade III - view of epiglottis only  Placement verified by: chest auscultation   Measured from: lips  Number of attempts at approach: 2    Additional Comments  Only able to see epiglottis with CMAC; uable to pass the ETT the first time; Ventilated and 2nd attempt with the CMAC successful with EBBS: ETCO2+: Atraumatic intubation; Lips and teeth same as pre op

## 2018-12-13 NOTE — OP NOTE
Date of Admission:  12/13/2018  Today's Date:  12/13/18  Giorgi Dia MD    Preoperative Diagnosis:   L4/5 degenerative disc disease.    Postoperative Diagnosis:   Same    Procedure Performed:   OLIF L4/5  anterior exposure.    CPT:  16775 Arthrodesis, anterior interbody technique, including minimal discectomy to prepare interspace (other than for decompression); lumbar  +03441 application of biomechanical device(ie cage)  +02146 morcellized allograft    Surgeon:   Giorgi Dia MD    Co-Surgeon:     Chavez Verde MD    Anesthesia:   General    Estimated Blood Loss:   100-250 cc    Findings:    Successful anterior exposure to the lumbar spine through the oblique lateral interbody fusion technique.    Implants:    Implant Name Type Inv. Item Serial No.  Lot No. LRB No. Used   SCRW ST JORGE SOLERA SEXTANT - [id number] Implant SCRW ST JORGE SOLERA SEXTANT  [employer]  Left 4   SCRW JORGE SOLERA SEXTANT MAS 6.5X55MM - [id number] Implant SCRW JORGE SOLERA SEXTANT MAS 6.5X55MM  [employer]  Left 1   SCRW JORGE SOLERA SEXTANT MAS 5.5X55MM - [id number] Implant SCRW JORGE SOLERA SEXTANT MAS 5.5X55MM  [employer]  Left 1   SCRW JORGE SOLERA SEXTANT MAS 6.5X60MM - [id number] Implant SCRW JORGE SOLERA SEXTANT MAS 6.5X60MM  [employer]  Left 2   ORTHOBLEND DBM JESSICA 5CC SM - [id number] - [id number] Implant ORTHOBLEND DBM JESSICA 5CC SM [id number] [employer]  Left 1   ORTHOBLEND DBM JESSICA 10CC SM - [id number] - [id number] Implant ORTHOBLEND DBM JESSICA 10CC SM [id number] [employer]  Left 1   JEANETTE SOLERA SEXTANT COCR PREBNT 4.75X40 - [id number] Implant JEANETTE SOLERA SEXTANT COCR PREBNT 4.75X40  [employer]  Left 2   SPACR CLYDESDALE PTC 6D 56M61BD - [id number] Implant SPACR CLYDESDALE PTC 6D [id number]  [employer]  Left 1   WAX BONE 2.5G - [id number] Implant WAX BONE 2.5G  [employer]  DIV OF J AND J [id number] Left 1       Staff:   Circulator: Gifty Garrett RN; Coby Chavez, JOSE CARLOS  Radiology Technologist: Tangela Jordan  Scrub Person:  Melear, Chrystal R, RN  Vendor Representative: Celestino Lind  Assistant: Rosita Ordoñez CSA    Specimen:   none    Complications:   none    Dispo:   to PACU    Indication for procedure:   68 y.o. male with degenerative lumbar disease.  He's been evaluated by neurosurgery and felt to be a candidate for interbody fusion.  I discussed with him and his family the risks, benefits, and alternatives to anterior spinal exposure.  He agreed to the procedure informed consent was obtained.     Description of procedure:   The patient was taken to the operating room, anesthesia was induced. The patient was placed in the left lateral decubitus position with 90° left side up. A full surgical timeout was performed. There was posterior instrumentation done by Dr. Verde with Neurosurgery prior to my anterior approach. For complete documentation of Dr. Verde procedure, please see his operative report.      For the anterior exposure to the L4/5 vertebral body, the Medtronic Stealth technology was used to radha the disk space, one-third distance between the anterior border of the disk and the umbilicus was marked. A roughly 6 cm skin incision was made at this level. Bovie electrocautery was used to dissect down to the external oblique fascia. This was opened in the direction of the muscle fibers. A muscle splitting technique was used to go through the internal oblique and transversalis muscle. The retroperitoneal fat plane was encountered. Using a finger sweep technique, dissection of the retroperitoneal fat was performed from a posterior to anterior approach down to the psoas muscle. Psoas was followed anteriorly until the disk space was identified. Utilizing Stealth navigation the disk space was clearly identified, retraction was performed medial then using combination of a Bovie electrocautery and blunt dissection the psoas was dissected off the posterior disk space until adequate posterior and anterior exposure was pleasant.   Ammon performed the diskectomy and the interbody fusion. Please see his operative report for complete description.  Meticulous hemostasis was obtained.  The retroperitoneal fat was allowed to fall back into place. The transversalis and internal oblique were closed with the appropriate Vicryl suture. The external oblique was closed using a #1 PDS suture. An anterior abdominal wall nerve block was performed.  Vicryl was used to close the superficial fat.  4-0 Vicryl was used to close the skin in subcuticular manner.        Giorgi Dia MD  12/13/18     Active Hospital Problems    Diagnosis Date Noted   • Chronic right-sided low back pain with left-sided sciatica [M54.42, G89.29] 12/13/2018   • Lumbar radiculopathy [M54.16] 09/21/2018   • Spinal stenosis, lumbar region, with neurogenic claudication [M48.062] 09/21/2018      Resolved Hospital Problems   No resolved problems to display.

## 2018-12-14 PROCEDURE — 25010000003 CEFAZOLIN IN DEXTROSE 2-4 GM/100ML-% SOLUTION: Performed by: NEUROLOGICAL SURGERY

## 2018-12-14 PROCEDURE — 99024 POSTOP FOLLOW-UP VISIT: CPT | Performed by: NURSE PRACTITIONER

## 2018-12-14 PROCEDURE — 97110 THERAPEUTIC EXERCISES: CPT

## 2018-12-14 PROCEDURE — 97161 PT EVAL LOW COMPLEX 20 MIN: CPT

## 2018-12-14 RX ORDER — PSEUDOEPHEDRINE HCL 30 MG
100 TABLET ORAL 2 TIMES DAILY
Qty: 30 EACH | Refills: 1 | Status: SHIPPED | OUTPATIENT
Start: 2018-12-14 | End: 2018-12-31

## 2018-12-14 RX ORDER — HYDROCODONE BITARTRATE AND ACETAMINOPHEN 5; 325 MG/1; MG/1
2 TABLET ORAL EVERY 4 HOURS PRN
Qty: 50 TABLET | Refills: 0 | Status: SHIPPED | OUTPATIENT
Start: 2018-12-14 | End: 2018-12-23

## 2018-12-14 RX ORDER — METHOCARBAMOL 750 MG/1
750 TABLET, FILM COATED ORAL EVERY 6 HOURS PRN
Qty: 40 TABLET | Refills: 1 | Status: SHIPPED | OUTPATIENT
Start: 2018-12-14

## 2018-12-14 RX ADMIN — CETIRIZINE HYDROCHLORIDE 10 MG: 10 TABLET, FILM COATED ORAL at 09:37

## 2018-12-14 RX ADMIN — CEFAZOLIN SODIUM 2 G: 2 INJECTION, SOLUTION INTRAVENOUS at 03:06

## 2018-12-14 RX ADMIN — HYDROCODONE BITARTRATE AND ACETAMINOPHEN 1 TABLET: 10; 325 TABLET ORAL at 05:11

## 2018-12-14 RX ADMIN — LISINOPRIL 10 MG: 10 TABLET ORAL at 09:37

## 2018-12-14 RX ADMIN — TAMSULOSIN HYDROCHLORIDE 0.4 MG: 0.4 CAPSULE ORAL at 09:37

## 2018-12-14 RX ADMIN — PANTOPRAZOLE SODIUM 40 MG: 40 TABLET, DELAYED RELEASE ORAL at 07:04

## 2018-12-14 RX ADMIN — FLUTICASONE PROPIONATE 2 SPRAY: 50 SPRAY, METERED NASAL at 09:38

## 2018-12-14 RX ADMIN — HYDROCODONE BITARTRATE AND ACETAMINOPHEN 1 TABLET: 10; 325 TABLET ORAL at 13:31

## 2018-12-14 RX ADMIN — ATORVASTATIN CALCIUM 10 MG: 10 TABLET, FILM COATED ORAL at 09:37

## 2018-12-14 NOTE — PLAN OF CARE
Problem: Patient Care Overview  Goal: Plan of Care Review   12/14/18 0906   Coping/Psychosocial   Plan of Care Reviewed With patient   OTHER   Outcome Summary Pt s/p L4-5 fusion & instrumentation due to stenosis. Presents with decreased bed mob, txfs, gait, endurance. He will benefit from skilled PT to address deficits to faciltate recovery & improved function for return home.

## 2018-12-14 NOTE — DISCHARGE SUMMARY
Damion Elizabeth  1950    Patient Care Team:  Zahra Graham APRN as PCP - General (Family Medicine)    Date of Admit: 12/13/2018    Date of Discharge:  12/14/2018    Discharge Diagnosis:  Lumbar radiculopathy    Spinal stenosis, lumbar region, with neurogenic claudication    Chronic right-sided low back pain with left-sided sciatica      Procedures Performed  Procedure(s):  Left L4/5 obique lateral interbody fusion and interbody instrumentation with posterior lateral arthrodesis and posterior instrumentation  ANTERIOR EXPOSURE LUMBAR SPINE       Complications: none    Consultants:   Consults     No orders found for last 30 day(s).          Condition on Discharge: stable    Discharge disposition: home    Pertinent Test Results: none    Brief HPI: Patient evaluated in office for complaints of back and bilateral leg pain. Imaging revealed degenerative arthritic changes at L4 5 and evidence of prior laminectomy on the left at L4 5.  There is also a disc osteophyte complex eccentric to the left with compression of the exiting L4 nerve root and the traversing L5 nerve root in the lateral recess.. RBAs of treatment were discussed including the above procedure. Patient consented to above procedure.    Hospital Course: Patient admitted for above procedure. The procedure itself was without complication. The patient was transferred to [facility] following recovery.  He has done very well overnight.  He states that he has gotten up and walked several times.  He is wearing his brace as requested.  He has voided, tolerated diet.  Pain is controlled with oral medications.  He states that his leg pain is significantly better.  He has incisional discomfort as expected.  He feels OK to go home.    Discharge Physical Exam:    Temp:  [97.5 °F (36.4 °C)-97.8 °F (36.6 °C)] 97.7 °F (36.5 °C)  Heart Rate:  [] 89  Resp:  [14-16] 16  BP: (118-131)/(66-90) 129/85    Current labs:  Lab Results (last 24 hours)     ** No results  found for the last 24 hours. **            General Appearance No acute distress   Neurological Awake, Alert, and oriented x 3   Motor Strength normal ivelisse LE except left IP giveaway weakness due to pain, tone normal   Gait and station Ambulating    Back Multiple lumbar incisions well approximated with no drainage or swelling.  There is surrounding ecchymosis at each incision site    Extremities Moves all extremities well, no edema, no cyanosis, no redness   Abdomen  Soft, nondistended.  Bowel sounds ×4 present.  Slight tenderness in the left lower quadrant secondary to incision.  The incisions are well approximated with some surrounding swelling and tenderness but no drainage.     Discharge Medications  Josue has been reviewed and narcotic consent is on file in the patient's chart.  Due to postoperative status, patient will likely require more than 72 hours narcotics.     Your medication list      START taking these medications      Instructions Last Dose Given Next Dose Due   docusate sodium 100 MG capsule      Take 100 mg by mouth 2 (Two) Times a Day.       HYDROcodone-acetaminophen 5-325 MG per tablet  Commonly known as:  NORCO      Take 2 tablets by mouth Every 4 (Four) Hours As Needed for Moderate Pain  for up to 9 days.          CHANGE how you take these medications      Instructions Last Dose Given Next Dose Due   methocarbamol 750 MG tablet  Commonly known as:  ROBAXIN  What changed:    · when to take this  · reasons to take this      Take 1 tablet by mouth Every 6 (Six) Hours As Needed for Muscle Spasms.          CONTINUE taking these medications      Instructions Last Dose Given Next Dose Due   acetaminophen 325 MG tablet  Commonly known as:  TYLENOL      Take 650 mg by mouth Every 6 (Six) Hours As Needed for Mild Pain .       BENADRYL ALLERGY PO      Take 25 mg by mouth Every Evening.       cetirizine 10 MG tablet  Commonly known as:  zyrTEC      Take 10 mg by mouth Daily.       fluticasone 50 MCG/ACT  nasal spray  Commonly known as:  FLONASE      2 sprays into the nostril(s) as directed by provider Daily.       gabapentin 300 MG capsule  Commonly known as:  NEURONTIN      Take 600 mg by mouth Every Night. Two capsules at bedtime       lisinopril 10 MG tablet  Commonly known as:  PRINIVIL,ZESTRIL      Take 10 mg by mouth Daily.       omeprazole 20 MG capsule  Commonly known as:  priLOSEC      Take 20 mg by mouth Daily.       pravastatin 10 MG tablet  Commonly known as:  PRAVACHOL      Take 10 mg by mouth Daily.       tamsulosin 0.4 MG capsule 24 hr capsule  Commonly known as:  FLOMAX      Take 1 capsule by mouth Daily.          STOP taking these medications    MULTIVITAMIN ADULT PO        naproxen 500 MG tablet  Commonly known as:  NAPROSYN              Where to Get Your Medications      These medications were sent to [facility] - [place], [place] - [address] [facility] - [phone]  - [phone] [address] [address]    Phone:  [phone]   · docusate sodium 100 MG capsule  · methocarbamol 750 MG tablet     You can get these medications from any pharmacy    Bring a paper prescription for each of these medications  · HYDROcodone-acetaminophen 5-325 MG per tablet         Discharge Diet:   Diet Instructions     Diet:      Diet Texture / Consistency:  Regular    Advance as tolerated        Diet Order   Procedures   • Diet Regular       Activity at Discharge:   Activity Instructions     Discharge Activity      1) No driving until cleared by us and no longer taking narcotics.   2) Off work/school until cleared by us.  3) May shower but no submerging wound in tub, pool, etc.  4) Do not lift / push / pull more then 5 lbs.  5.) Wear brace at all times when sitting more than 30°, standing, walking; may have brace off for showers daily  6.) No bending or twisting    Pelvic Rest            Call for: questions or concerns    Follow-up Appointments  Future Appointments   Date  Time Provider Department [place]   12/31/2018 11:30 AM Ana Cristina Espinoza APRN [facility] None     Follow-up Information     Zahra Graham APRN .    Specialty:  Family Medicine  Contact information:  [address] [facility] [id number] [phone]                 Additional Instructions for the Follow-ups that You Need to Schedule     Notify Physician or Go To The ED For the Following Conditions   As directed      Including but not limited to fever >100.5, chills, wound concerns (redness, swelling, drainage), new symptoms of numbness, tingling, weakness; new or uncontrolled pain despite using prescribed medications    Order Comments:  Including but not limited to fever >100.5, chills, wound concerns (redness, swelling, drainage), new symptoms of numbness, tingling, weakness; new or uncontrolled pain despite using prescribed medications                Test Results Pending at Discharge     None    I discussed the discharge instructions with patient and family    JENIFER Christensen  12/14/18  11:56 AM    30 min spent in reviewing records, discussion and examination of the patient and discussion with other members of the patient's medical team.

## 2018-12-14 NOTE — PROGRESS NOTES
Name: Damion Elizabeth ADMIT: 2018   : 1950  PCP: Zahra Graham APRN    MRN: [id number] LOS: 1 days   AGE/SEX: 68 y.o. male  ROOM: [facility]   [place]illin, Post Op Global    No chief complaint on file.    CC: Postop follow-up.  Subjective     68 y.o. male overall doing well.  Has expected extensive incisional pain.  Legs are really feel better.  Did have one episode of vomiting) resolved and is currently tolerating liquid diet.    Review of Systems    Objective     Scheduled Medications:     atorvastatin 10 mg Oral Daily   cetirizine 10 mg Oral Daily   fluticasone 2 spray Nasal Daily   gabapentin 600 mg Oral Nightly   lisinopril 10 mg Oral Daily   methocarbamol 750 mg Oral Nightly   pantoprazole 40 mg Oral QAM   tamsulosin 0.4 mg Oral Daily       Active Infusions:    lactated ringers 100 mL/hr Last Rate: Stopped (18 0921)   lactated ringers 9 mL/hr    lactated ringers 75 mL/hr Last Rate: 75 mL/hr (18 1311)       As Needed Medications:  •  acetaminophen  •  bisacodyl  •  diphenhydrAMINE  •  docusate sodium  •  HYDROcodone-acetaminophen  •  HYDROcodone-acetaminophen  •  ondansetron **OR** ondansetron ODT **OR** ondansetron  •  oxyCODONE    Vital Signs  Vital Signs   Patient Vitals for the past 24 hrs:   BP Temp Temp src Pulse Resp SpO2   18 2106 129/85 97.7 °F (36.5 °C) Oral 89 16 --   18 1700 128/90 97.8 °F (36.6 °C) Oral 94 16 --   18 1417 127/88 -- Oral 104 16 97 %   18 1355 131/83 97.8 °F (36.6 °C) Oral 109 -- --   18 1330 124/82 -- -- 112 14 97 %   18 1310 121/78 -- -- 114 14 96 %   18 1300 118/75 -- -- 111 14 97 %   18 1245 122/73 -- -- 115 14 97 %   18 1240 128/66 -- -- 115 14 98 %   18 1235 122/70 -- -- (!) 124 14 97 %   18 1229 126/75 97.5 °F (36.4 °C) Oral (!) 125 14 97 %     I/O:  I/O last 3 completed shifts:  In: 5600 [I.V.:5600]  Out: 1820 [Urine:1620; Blood:200]    Physical Exam:  Physical Exam    Constitutional: He is oriented to person, place, and time. He appears well-developed.   Pulmonary/Chest: Effort normal. No respiratory distress.   Abdominal: Soft. He exhibits no distension.   Neurological: He is alert and oriented to person, place, and time.       Results Review:     CBC      BMP     Cr Clearance Estimated Creatinine Clearance: 96.1 mL/min (A) (by C-G formula based on SCr of 0.75 mg/dL (L)).  Coag     HbA1C No results found for: HGBA1C  Blood Glucose   Glucose   Date/Time Value Ref Range Status   12/13/2018 0601 110 70 - 130 mg/dL Final     Infection     Radiology(recent) No radiology results for the last day    Assessment/Plan     Assessment & Plan      Lumbar radiculopathy    Spinal stenosis, lumbar region, with neurogenic claudication    Chronic right-sided low back pain with left-sided sciatica      68 y.o. male postop day #1 L4-L5 oblique lateral interbody fusion with anterior exposure.  From my standpoint seems to be doing well.    Giorgi Dia MD  12/14/18  9:35 AM    Please call my office with any question: [phone]    Active Hospital Problems    Diagnosis Date Noted   • Chronic right-sided low back pain with left-sided sciatica [M54.42, G89.29] 12/13/2018   • Lumbar radiculopathy [M54.16] 09/21/2018   • Spinal stenosis, lumbar region, with neurogenic claudication [M48.062] 09/21/2018      Resolved Hospital Problems   No resolved problems to display.

## 2018-12-14 NOTE — PROGRESS NOTES
Discharge Planning Assessment  [facility]     Patient Name: Damion Elizabeth  MRN: [id number]  Today's Date: 12/14/2018    Admit Date: 12/13/2018    Discharge Needs Assessment     Row Name 12/14/18 1433       Living Environment    Lives With  spouse    Name(s) of Who Lives With Patient  spouse Maricruz Elizabeth [phone]    Current Living Arrangements  home/apartment/condo    Primary Care Provided by  self;spouse/significant other    Provides Primary Care For  no one    Family Caregiver if Needed  spouse    Family Caregiver Names  spouse Maricruz Elizabeth [phone]    Quality of Family Relationships  helpful    Able to Return to Prior Arrangements  yes       Resource/Environmental Concerns    Resource/Environmental Concerns  home accessibility    Home Accessibility Concerns  stairs to enter home 2 steps with no handrails to enter the home.    Transportation Concerns  car, none       Transition Planning    Patient/Family Anticipates Transition to  home with family    Patient/Family Anticipated Services at Transition  durable medical equipment    Transportation Anticipated  family or friend will provide       Discharge Needs Assessment    Concerns to be Addressed  decision making    Equipment Currently Used at Home  walker, rolling    Anticipated Changes Related to Illness  none    Equipment Needed After Discharge  commode    Offered/Gave Vendor List  yes    Current Discharge Risk  physical impairment        Discharge Plan     Row Name 12/14/18 1431       Plan    Plan  Home with assistance from his wife; denies any needs and has an order for a 3 in 1 commode in case needed but did not want CCP to order for him.    Plan Comments  Met with the patient at bedside; explained role of CCP, verified facesheet, checked IMM and discussed discharge planning needs.  The patient plans to return home with assistance from his spouse, has walker, does not want CCP to order the 3 in 1 commode for him but wants to take the order home  in case he feels he needs one later on as he does not feel like he currently needs one.  The patient has 2 steps to enter his single story home in [facility]), his PCP is Zahra Graham, pharmacy is [facility] pharmacy and he denies any trouble remembering to take his medication or with affording his medication.  The patient is unsure who he has used for HH in the past, denies any SNF history, denies any POA documents and states that his wife will transport him home upon d/c.  The patient was provided with HH/SNF list and CCP telephone contact number in case any additional information is needed if the patient attempts to get a 3 in1 commode.  CCP will follow to assist with the patient's d/c home with an order for a 3 in 1 commode (copy provided to patient, copy placed in patient's chart and copy placed in HIM basket).  SHAMEKA Golden        Destination      No service coordination in this encounter.      Durable Medical Equipment      No service coordination in this encounter.      Dialysis/Infusion      No service coordination in this encounter.      Home Medical Care      No service coordination in this encounter.      Community Resources      No service coordination in this encounter.        Expected Discharge Date and Time     Expected Discharge Date Expected Discharge Time    Dec 14, 2018         Demographic Summary     Row Name 12/14/18 1432       General Information    Admission Type  inpatient    Arrived From  home    Referral Source  nursing;physician    Reason for Consult  discharge planning    Preferred Language  English     Used During This Interaction  no        Functional Status     Row Name 12/14/18 1432       Functional Status    Usual Activity Tolerance  good    Current Activity Tolerance  fair       Functional Status, IADL    Medications  assistive equipment    Meal Preparation  assistive equipment    Housekeeping  assistive equipment    Laundry  assistive  equipment    Shopping  assistive equipment       Mental Status    General Appearance WDL  WDL       Mental Status Summary    Recent Changes in Mental Status/Cognitive Functioning  no changes        Psychosocial    No documentation.       Abuse/Neglect    No documentation.       Legal    No documentation.       Substance Abuse    No documentation.       Patient Forms     Row Name 12/14/18 1546       Patient Forms    Provider Choice List  Delivered    Delivered to  Patient    Method of delivery  In person            SHAMEKA Villalobos

## 2018-12-14 NOTE — PROGRESS NOTES
Case Management Discharge Note    Final Note: Home; no needs identified.  SHAMEKA Golden    Destination      No service has been selected for the patient.      Durable Medical Equipment      No service has been selected for the patient.      Dialysis/Infusion      No service has been selected for the patient.      Home Medical Care      No service has been selected for the patient.      Community Resources      No service has been selected for the patient.        Other: Other(Private auto)    Final Discharge Disposition Code: 01 - home or self-care

## 2018-12-14 NOTE — THERAPY EVALUATION
"Acute Care - Physical Therapy Initial Evaluation  [facility]     Patient Name: Damion Elizabeth  : 1950  MRN: [id number]  Today's Date: 2018   Onset of Illness/Injury or Date of Surgery: 18  Date of Referral to PT: 18         Admit Date: 2018    Visit Dx:     ICD-10-CM ICD-9-CM   1. Difficulty walking R26.2 719.7   2. Lumbar radiculopathy M54.16 724.4   3. Spinal stenosis, lumbar region, with neurogenic claudication M48.062 724.03     Patient Active Problem List   Diagnosis   • DDD (degenerative disc disease), lumbar   • Low back pain   • Lumbar radiculopathy   • Spinal stenosis, lumbar region, with neurogenic claudication   • Cancer (CMS/HCC)   • Hypertension   • Chronic right-sided low back pain with left-sided sciatica     Past Medical History:   Diagnosis Date   • Arthritis    • BPH (benign prostatic hyperplasia)    • Cancer (CMS/HCC)    • DDD (degenerative disc disease), lumbar    • GERD (gastroesophageal reflux disease)    • Hearing loss, right    • History of cancer tonsil 2015    HAD SURGERY, RADIATION AND CHEMO   • Hyperlipidemia    • Hypertension    • Low back pain     RADIATES DOWN BLE, WORSE ON LT   • Lumbar radiculopathy    • Shortness of breath     R/T \"CORN DUST\" FROM FARMING   • Spinal stenosis, lumbar region, with neurogenic claudication      Past Surgical History:   Procedure Laterality Date   • CATARACT EXTRACTION WITH INTRAOCULAR LENS IMPLANT     • FOOT SURGERY Right     CRUSH INJURY   • INNER EAR SURGERY      Ear Drum repair   • KNEE SURGERY      cyst removal   • LEG DEBRIDEMENT Right    • NECK DISSECTION      LYMPH NODES REMOVED WHEN HAD TONSILAR CANCER   • TONSILLECTOMY          PT ASSESSMENT (last 12 hours)      Physical Therapy Evaluation     Row Name 18 0859          PT Evaluation Time/Intention    Subjective Information  complains of;weakness;pain  -EF     Document Type  evaluation  -EF     Mode of Treatment  physical therapy "  -EF     Patient Effort  good  -EF     Symptoms Noted During/After Treatment  fatigue  -EF     Row Name 12/14/18 0859          General Information    Patient Profile Reviewed?  yes  -EF     Onset of Illness/Injury or Date of Surgery  12/13/18  -EF     Patient Observations  alert;cooperative;agree to therapy  -EF     General Observations of Patient  sitting up in chair  -EF     Prior Level of Function  independent:  -EF     Pertinent History of Current Functional Problem  s/p L4-5 fusion & instrumentation due to stenosis.  -EF     Existing Precautions/Restrictions  brace worn when out of bed;fall;spinal  -EF     Row Name 12/14/18 0859          Cognitive Assessment/Intervention- PT/OT    Orientation Status (Cognition)  oriented x 4  -EF     Follows Commands (Cognition)  WNL  -EF     Row Name 12/14/18 0859          Bed Mobility Assessment/Treatment    Comment (Bed Mobility)  NT-up in chair  -EF     Row Name 12/14/18 0859          Transfer Assessment/Treatment    Transfer Assessment/Treatment  sit-stand transfer;stand-sit transfer  -EF     Sit-Stand [place] (Transfers)  supervision;contact guard  -EF     Stand-Sit [place] (Transfers)  supervision;contact guard  -EF     Row Name 12/14/18 0859          Gait/Stairs Assessment/Training    [place] Level (Gait)  supervision;contact guard  -EF     Assistive Device (Gait)  -- pt pushed IV pole  -EF     Distance in Feet (Gait)  80  -EF     Deviations/Abnormal Patterns (Gait)  gait speed decreased  -EF     Row Name 12/14/18 0859          General ROM    GENERAL ROM COMMENTS  WFL  -EF     Row Name 12/14/18 0859          MMT (Manual Muscle Testing)    General MMT Comments  >3/5 throughout  -EF     Row Name 12/14/18 0859          Motor Assessment/Intervention    Additional Documentation  Therapeutic Exercise (Group);Therapeutic Exercise Interventions (Group)  -EF     Row Name 12/14/18 0859          Therapeutic Exercise    Comment (Therapeutic Exercise)  sitting AP, LAQ  x 10 reps each.  -EF     Row Name 12/14/18 0859          Pain Assessment    Additional Documentation  Pain Scale: Numbers Pre/Post-Treatment (Group)  -EF     Row Name 12/14/18 0859          Pain Scale: Numbers Pre/Post-Treatment    Pain Scale: Numbers, Pretreatment  4/10  -EF     Pain Scale: Numbers, Post-Treatment  4/10  -EF     Pain Location  back  -EF     Pain Intervention(s)  Medication (See MAR);Repositioned;Ambulation/increased activity  -EF     Row Name             Wound 12/13/18 1155 Other (See comments) back incision    Wound - Properties Group Date first assessed: 12/13/18  -LD Time first assessed: 1155  -LD Side: Other (See comments)  -LD Location: back  -LD Type: incision  -LD    Row Name             Wound 12/13/18 1424 Left lower flank incision    Wound - Properties Group Date first assessed: 12/13/18  -TB Time first assessed: 1424  -TB Side: Left  -TB Orientation: lower  -TB Location: flank  -TB Type: incision  -TB    Row Name 12/14/18 0859          Physical Therapy Clinical Impression    Date of Referral to PT  12/14/18  -EF     Criteria for Skilled Interventions Met (PT Clinical Impression)  yes;treatment indicated  -EF     Rehab Potential (PT Clinical Summary)  good, to achieve stated therapy goals  -EF     Row Name 12/14/18 0859          Physical Therapy Goals    Bed Mobility Goal Selection (PT)  bed mobility, PT goal 1  -EF     Transfer Goal Selection (PT)  transfer, PT goal 1  -EF     Gait Training Goal Selection (PT)  gait training, PT goal 1  -EF     Row Name 12/14/18 0859          Bed Mobility Goal 1 (PT)    Activity/Assistive Device (Bed Mobility Goal 1, PT)  bed mobility activities, all  -EF     [place] Level/Cues Needed (Bed Mobility Goal 1, PT)  conditional independence  -EF     Time Frame (Bed Mobility Goal 1, PT)  1 week  -EF     Row Name 12/14/18 0859          Transfer Goal 1 (PT)    Activity/Assistive Device (Transfer Goal 1, PT)  transfers, all  -EF     [place] Level/Cues  Needed (Transfer Goal 1, PT)  conditional independence  -EF     Time Frame (Transfer Goal 1, PT)  1 week  -EF     Row Name 12/14/18 0859          Gait Training Goal 1 (PT)    Activity/Assistive Device (Gait Training Goal 1, PT)  gait (walking locomotion)  -EF     [place] Level (Gait Training Goal 1, PT)  conditional independence;supervision required  -EF     Distance (Gait Goal 1, PT)  150  -EF     Time Frame (Gait Training Goal 1, PT)  1 week  -EF     Row Name 12/14/18 0859          Positioning and Restraints    Pre-Treatment Position  sitting in chair/recliner  -EF     Post Treatment Position  chair  -EF     In Chair  sitting;call light within reach;encouraged to call for assist  -EF       User Key  (r) = Recorded By, (t) = Taken By, (c) = Cosigned By    Initials Name Provider Type    EF Charisse Dey, PT Physical Therapist    Kip Arredondo RN Registered Nurse    Gifty Castro RN Registered Nurse        Physical Therapy Education     Title: PT OT SLP Therapies (Done)     Topic: Physical Therapy (Done)     Point: Mobility training (Done)     Learning Progress Summary           Patient Acceptance, E, VU,NR by EF at 12/14/2018  9:06 AM                   Point: Home exercise program (Done)     Learning Progress Summary           Patient Acceptance, E, VU,NR by EF at 12/14/2018  9:06 AM                   Point: Body mechanics (Done)     Learning Progress Summary           Patient Acceptance, E, VU,NR by EF at 12/14/2018  9:06 AM                   Point: Precautions (Done)     Learning Progress Summary           Patient Acceptance, E, VU,NR by EF at 12/14/2018  9:06 AM                               User Key     Initials Effective Dates Name Provider Type Discipline    EF 06/08/18 -  Charisse Dey PT Physical Therapist PT              PT Recommendation and Plan  Anticipated Discharge Disposition (PT): home  Therapy Frequency (PT Clinical Impression): daily  Outcome Summary/Treatment Plan  (PT)  Anticipated Discharge Disposition (PT): home  Plan of Care Reviewed With: patient  Outcome Summary: Pt s/p L4-5 fusion & instrumentation due to stenosis. Presents with decreased bed mob, txfs, gait, endurance. He will benefit from skilled PT to address deficits to faciltate recovery & improved function for return home.  Outcome Measures     Row Name 12/14/18 0900             How much help from another person do you currently need...    Turning from your back to your side while in flat bed without using bedrails?  3  -EF      Moving from lying on back to sitting on the side of a flat bed without bedrails?  3  -EF      Moving to and from a bed to a chair (including a wheelchair)?  3  -EF      Standing up from a chair using your arms (e.g., wheelchair, bedside chair)?  3  -EF      Climbing 3-5 steps with a railing?  3  -EF      To walk in hospital room?  3  -EF      AM-PAC 6 Clicks Score  18  -EF         Functional Assessment    Outcome Measure Options  AM-PAC 6 Clicks Basic Mobility (PT)  -EF        User Key  (r) = Recorded By, (t) = Taken By, (c) = Cosigned By    Initials Name Provider Type    Charisse Tinajero, PT Physical Therapist         Time Calculation:   PT Charges     Row Name 12/14/18 0907             Time Calculation    Start Time  0838  -EF      Stop Time  0857  -EF      Time Calculation (min)  19 min  -EF      PT Received On  12/14/18  -EF      PT - Next Appointment  12/15/18  -EF      PT Goal Re-Cert Due Date  12/21/18  -EF        User Key  (r) = Recorded By, (t) = Taken By, (c) = Cosigned By    Initials Name Provider Type    Charisse Tinajero, PT Physical Therapist        Therapy Suggested Charges     Code   Minutes Charges    None           Therapy Charges for Today     Code Description Service Date Service Provider Modifiers Qty    55137772307 HC PT EVAL LOW COMPLEXITY 2 12/14/2018 Charisse Dey, PT GP 1    88675838258 HC PT THER PROC EA 15 MIN 12/14/2018 Charisse Dey, PT GP 1           PT G-Codes  Outcome Measure Options: AM-PAC 6 Clicks Basic Mobility (PT)  AM-PAC 6 Clicks Score: 18      Charisse Dey, PT  12/14/2018

## 2018-12-15 ENCOUNTER — READMISSION MANAGEMENT (OUTPATIENT)
Dept: CALL CENTER | Facility: HOSPITAL | Age: 68
End: 2018-12-15

## 2018-12-15 NOTE — OUTREACH NOTE
Prep Survey      Responses   Facility patient discharged from?  [facility]   Is patient eligible?  Yes   Discharge diagnosis  Lumbar radiculopathySpinal stenosis, lumbar region, with neurogenic claudicationLeft L4/5 obique lateral interbody fusion and interbody instrumentation    Does the patient have one of the following disease processes/diagnoses(primary or secondary)?  General Surgery   Does the patient have Home health ordered?  No   Is there a DME ordered?  No   Prep survey completed?  Yes          Juany Braden RN

## 2018-12-17 ENCOUNTER — READMISSION MANAGEMENT (OUTPATIENT)
Dept: CALL CENTER | Facility: HOSPITAL | Age: 68
End: 2018-12-17

## 2018-12-17 NOTE — OUTREACH NOTE
General Surgery Week 1 Survey      Responses   Facility patient discharged from?  [facility]   Does the patient have one of the following disease processes/diagnoses(primary or secondary)?  General Surgery   Is there a successful TCM telephone encounter documented?  No   Week 1 attempt successful?  Yes   Call start time  1354   Call end time  1358   Discharge diagnosis  Lumbar radiculopathySpinal stenosis, lumbar region, with neurogenic claudicationLeft L4/5 obique lateral interbody fusion and interbody instrumentation    Is patient permission given to speak with other caregiver?  Yes   List who call center can speak with  wife   Meds reviewed with patient/caregiver?  Yes   Is the patient having any side effects they believe may be caused by any medication additions or changes?  No   Does the patient have all medications related to this admission filled (includes all antibiotics, pain medications, etc.)  Yes   Is the patient taking all medications as directed (includes completed medication regime)?  Yes   Does the patient have a follow up appointment scheduled with their surgeon?  Yes   Has the patient kept scheduled appointments due by today?  N/A   Has home health visited the patient within 72 hours of discharge?  N/A   Has all DME been delivered?  Yes   Psychosocial issues?  No   Did the patient receive a copy of their discharge instructions?  Yes   Nursing interventions  Reviewed instructions with patient   What is the patient's perception of their health status since discharge?  Improving   Nursing interventions  Nurse provided patient education   Is the patient /caregiver able to teach back basic post-op care?  Continue use of incentive spirometry at least 1 week post discharge, Drive as instructed by MD in discharge instructions, Take showers only when approved by MD-sponge bathe until then, No tub bath, swimming, or hot tub until instructed by MD, Lifting as instructed by MD in discharge instructions, Do not  remove steri-strips, Keep incision areas clean,dry and protected   Is the patient/caregiver able to teach back signs and symptoms of incisional infection?  Increased redness, swelling or pain at the incisonal site, Increased drainage or bleeding, Pus or odor from incision, Incisional warmth, Fever   Is the patient/caregiver able to teach back steps to recovery at home?  Set small, achievable goals for return to baseline health, Rest and rebuild strength, gradually increase activity, Eat a well-balance diet, Make a list of questions for surgeon's appointment   If the patient is a current smoker, are they able to teach back resources for cessation?  Smoking cessation medications, Smoking cessation classes, [id number], Smoking cessation support groups   Is the patient/caregiver able to teach back the hierarchy of who to call/visit for symptoms/problems? PCP, Specialist, Home health nurse, Urgent Care, ED, 911  Yes   Week 1 call completed?  Yes          Rosita Alves RN

## 2018-12-23 NOTE — OP NOTE
LUMBAR DISCECTOMY ANTERIOR WITH FUSION INSTRUMENTATION 1-2 LEVELS  Procedure Note    Damion Elizabeth  12/13/2018  [id number]    SURGEON  Chavez Verde MD    COSURGEON: Giorgi Dia MD    ASSISTANT: Rosita Ordoñez CSA whose presence was essential for retraction, providing suctioning for visualization, closure etc.    INDICATION:  Intractable leg pain    Pre-op Diagnosis:   Lumbar radiculopathy [M54.16]  Spinal stenosis, lumbar region, with neurogenic claudication [M48.062]    Post-op Diagnosis:     Post-Op Diagnosis Codes:     * Lumbar radiculopathy [M54.16]     * Spinal stenosis, lumbar region, with neurogenic claudication [M48.062]    PROCEDURE PERFORMED:  Procedure(s):  Left L4/5 obique lateral interbody fusion and interbody instrumentation with posterior lateral arthrodesis and posterior instrumentation  ANTERIOR EXPOSURE LUMBAR SPINE    1. L4 5 oblique lateral interbody fusion (Anterior arthrodesis 70437)   2. Placement of interbody prosthetic device  (63939) (10 mm X 55 mm Cly[place]dale prosthetic device)  3. Posterior lumbar instrumentation L4-5 -segmental (Right L4 6.5 mm X 60 mm, Left L4 6.5 mm X 55 mm, Right L5 6.5 mm X 60 mm, Left L5 6.5 mm X 55 mm. Right gianna 40 mm, Left gianna 40 mm).  4.  Preoperative planning and intraoperative stereotactic guidance with the use of the Stealth navigation system and O arm intraoperative imagery  5. Continuous EMG throughout the operative intervention - 3 hours  6. Neural junction monitoring test × 4  7. Allograft (23298)    Anesthesia: General    Staff:   Circulator: Gifty Garrett RN; Coby Chavez RN  Radiology Technologist: Tangela Jordan  Scrub Person: Chrystal Barbosa RN; Jane Wheatley  Vendor Representative: Celestino Lind  Assistant: Rosita Ordoñez CSA    Estimated Blood Loss: 200 mL    Specimens:                * No orders in the log *    Findings:   Severe degenerative disc disease with severe disc space subsidence    Drains:   [REMOVED]  Urethral Catheter Silicone (Removed)   Daily Indications < 24 hr post op 12/13/2018  9:00 PM   Site Assessment Clean;Skin intact 12/13/2018  2:17 PM   Collection Container Standard drainage bag 12/13/2018  9:00 PM   Securement Method Securing device 12/13/2018  9:00 PM   Catheter care complete Yes 12/13/2018  9:00 PM   Output (mL) 300 mL 12/14/2018  6:25 AM       Complications: None                  Findings: Severe degenerative disc disease with severe disc space subsidence    Details:    Positioning/EMG electrodes/ continuous EMG recording/ and neural junction monitoring: The patient was brought to the operating room where general endotracheal anesthesia was induced.   Supine on the rney EMG electrodes were placed into the adductor medius, vastus lateralis, extensor hallucis longus, and medial gastrocnemius muscles bilaterally.  Continuous EMG monitoring was then accomplished throughout the  procedure.  No changes in EMG electrode recording were noted that could not be rapidly changed and eliminated by a minor change and operative intervention.  Neural junction monitoring: During placement of pedicle screws and electrified Jamshidi needle at 6 mA was advanced under stereotactic guidance into the pedicles at L4 and L5 bilaterally.  A K wire was placed and then a tap electrified at 20 mA was used over the K wire to tap each pedicle.  Finally placement of the pedicle screws was accomplished with electrodesiccation of the screws to 20 mA.  No abnormal EMG activity was noted during the neural junction monitoring tests.    Positioning/approach: Once the patient was placed onto the Konstantin table in the right lateral decubitus position care was taken to pad all susceptible areas.  The lumbar and left abdominal area was prepped and draped in the usual sterile manner.  A post was placed into the left iliac crest and attached to a stereotactic array.  A rotational CT was then accomplished and reconstructed on a separate  workstation for stereotactic guidance.  Using a stereotactic localizing device identify the appropriate entry point over each pedicle at L4 and L5 and local anesthesia was injected and then an incision was made approximately 4 cm from midline on either side measuring approximately 2-1/2 cm in length.    Placement of localizing K wires under stereotactic guidance: The L4 and L5 bilateral pedicles were targeted with the stereotactically guided dilator and 5.0 tap..  Once in place C arm x-ray confirmation of placement was accomplished. A long K wire was placed into each pedicle. Over each K wire I then placed the noted screws above. No abnormal EMG electrical activity occurred.      Lumbar decompression: Anterior exposure to the L4-L5 vertebral body was accomplished after using the Stealth Stereotactic technology to radha the disk space one-third distance between the anterior border of the disk and the umbilicus. A 6 cm skin incision was created through the skin. Electrocautery was used to dissect down to the external oblique fascia. The external oblique fascia was opened in the same direction of the muscle fibers itself. A muscle splitting technique was used to go through the internal oblique and transversalis muscle. We accessed the retroperitoneal fat plane. Using finger sweep technique and palpating the internal portion of the iliac wing, we dissected the retroperitoneal fat with a posterior to anterior sweeping motion, getting down to the psoas muscle. The psoas muscle was palpated anteriorly until the disk space was identified. A lighted peritoneal hand held retractor was placed on the peritoneal contents. Stereotactic guidance confirmed the disk identity. A second hand held lipped retractor was placed under the belly of the psoas muscle and retraction was accomplished. Using a combination of a Bovie electrocautery and blunt dissection with a laparoscopic [place] the psoas was dissected off the posterior disk space  posteriorly until adequate posterior and anterior exposure was present. The psoas retractor was then fixated to the L4 vertebral body with a stay post.    Interbody arthrodesis: I then incised the lateral annulus of L4/5 disc on the left. I entered the disc space with a  8 up to a 12 mm shaver and a series of curettes removing as much of the disc that I could and I scrapped the endplates to accomplish arthrodesis. I then used a navigated [place] elevator and popped through the contralateral anulus to free up the disc space for distraction.     I then advanced a 10 X 55mm sizer in the disc space under stereotactic guidance and removed the sizer.    Interbody prosthetic device: I sized to a 10 mm x 55 mm [place] graft.  I stuffed the interspace with the autograft bone and then I stuffed the  graft itself with the allograft bone that was collected. The device was then impacted into the interspace and x-ray confirmation of placement completely within the interspace and in the midline was accomplished.    I then placed 40 mm rods bilaterally using sextant technology. I placed the locking nuts and broke them off according to manufacturers specification.    Abdominal closure: At this point good hemostasis was obtained and copious irrigation used and then the retroperitoneal fat was allowed to fall back into place. The transversalis and internal oblique were closed with the appropriate Vicryl suture. The external oblique was closed using a #1  PDS suture. An anterior abdominal wall nerve block was performed and the Vicryl was used to close the superficial fat and then Vicryl was used to close the skin in subcuticular manner.        Posterior closure: Copious irrigation was used hemostasis was obtained the wounds were closed in appropriate layers skin reapproximated with [place] bunch.    The patient was then awakened from general endotracheal anesthesia, extubated, and taken to the recovery room in good condition.    Chavez  CASSY Verde MD     Date: 12/23/2018  Time: 4:11 PM

## 2018-12-27 ENCOUNTER — READMISSION MANAGEMENT (OUTPATIENT)
Dept: CALL CENTER | Facility: HOSPITAL | Age: 68
End: 2018-12-27

## 2018-12-27 NOTE — OUTREACH NOTE
General Surgery Week 2 Survey      Responses   Facility patient discharged from?  [facility]   Does the patient have one of the following disease processes/diagnoses(primary or secondary)?  General Surgery   Week 2 attempt successful?  Yes   Call start time  1622   Call end time  1626   Discharge diagnosis  Lumbar radiculopathySpinal stenosis, lumbar region, with neurogenic claudicationLeft L4/5 obique lateral interbody fusion and interbody instrumentation    Person spoke with today (if not patient) and relationship  Cristal, daughter   Meds reviewed with patient/caregiver?  Yes   Is the patient taking all medications as directed (includes completed medication regime)?  Yes   Has the patient kept scheduled appointments due by today?  N/A   Comments  12/31 neurosurgery   What is the patient's perception of their health status since discharge?  Improving   Additional teach back comments  Limited information, spoke with daughter, but he is doing well.  Patient is napping at this time.   Week 2 call completed?  Yes          Ana Abel RN

## 2018-12-31 ENCOUNTER — OFFICE VISIT (OUTPATIENT)
Dept: NEUROSURGERY | Facility: CLINIC | Age: 68
End: 2018-12-31

## 2018-12-31 ENCOUNTER — HOSPITAL ENCOUNTER (OUTPATIENT)
Dept: GENERAL RADIOLOGY | Facility: HOSPITAL | Age: 68
Discharge: HOME | End: 2018-12-31

## 2018-12-31 ENCOUNTER — HOSPITAL ENCOUNTER (OUTPATIENT)
Dept: GENERAL RADIOLOGY | Facility: HOSPITAL | Age: 68
Discharge: HOME | End: 2018-12-31
Admitting: NURSE PRACTITIONER

## 2018-12-31 VITALS
SYSTOLIC BLOOD PRESSURE: 90 MMHG | DIASTOLIC BLOOD PRESSURE: 60 MMHG | RESPIRATION RATE: 16 BRPM | WEIGHT: 198 LBS | HEART RATE: 68 BPM | BODY MASS INDEX: 31.48 KG/M2

## 2018-12-31 DIAGNOSIS — Z98.890 POST-OPERATIVE STATE: ICD-10-CM

## 2018-12-31 DIAGNOSIS — M48.061 SPINAL STENOSIS, LUMBAR REGION, WITHOUT NEUROGENIC CLAUDICATION: ICD-10-CM

## 2018-12-31 DIAGNOSIS — Z98.890 POSTOPERATIVE STATE: ICD-10-CM

## 2018-12-31 DIAGNOSIS — G89.29 CHRONIC RIGHT-SIDED LOW BACK PAIN WITH LEFT-SIDED SCIATICA: ICD-10-CM

## 2018-12-31 DIAGNOSIS — M48.061 SPINAL STENOSIS, LUMBAR REGION, WITHOUT NEUROGENIC CLAUDICATION: Primary | ICD-10-CM

## 2018-12-31 DIAGNOSIS — M54.42 CHRONIC RIGHT-SIDED LOW BACK PAIN WITH LEFT-SIDED SCIATICA: ICD-10-CM

## 2018-12-31 DIAGNOSIS — M54.16 LUMBAR RADICULOPATHY: ICD-10-CM

## 2018-12-31 DIAGNOSIS — M48.062 SPINAL STENOSIS, LUMBAR REGION, WITH NEUROGENIC CLAUDICATION: Primary | ICD-10-CM

## 2018-12-31 PROCEDURE — 72100 X-RAY EXAM L-S SPINE 2/3 VWS: CPT

## 2018-12-31 PROCEDURE — 99024 POSTOP FOLLOW-UP VISIT: CPT | Performed by: NURSE PRACTITIONER

## 2018-12-31 RX ORDER — HYDROCODONE BITARTRATE AND ACETAMINOPHEN 5; 325 MG/1; MG/1
1 TABLET ORAL EVERY 6 HOURS PRN
Qty: 30 TABLET | Refills: 0 | Status: SHIPPED | OUTPATIENT
Start: 2018-12-31

## 2018-12-31 NOTE — PROGRESS NOTES
HPI:   Damion Elizabeth is a 68 y.o. male for follow-up s/p L4/5 OLIF with Dr Verde on December 13.  He has undergone postop x-rays.    Preoperative, he had intractable left leg pain secondary to severe lumbar spinal stenosis with neurogenic claudication.  He reports complete resolution of the left leg pain.  He does have a sensory change in the left thigh that is not associated with any discomfort.  He continues to have slight weakness in the left leg but this is improving postop.  He has been walking daily.  He's been wearing the LSO brace as directed.  He denies any new issues and all of the incision sites are healing well.  He is requesting a refill on the hydrocodone as it helps him sleep when the pain worsens at night.  He continues to take the muscle relaxers as needed.    He presents accompanied by his spouse and young granddaughter.     Review of Systems   Constitutional: Negative for fever.   HENT: Negative for trouble swallowing.    Gastrointestinal: Negative for abdominal pain.   Musculoskeletal: Positive for back pain. Negative for gait problem.   Skin: Negative for rash.   Neurological: Positive for weakness and numbness.   Psychiatric/Behavioral: Positive for sleep disturbance.        BP 90/60 (BP Location: Left arm, Patient Position: Sitting, Cuff Size: Adult)   Pulse 68   Resp 16   Wt 89.8 kg (198 lb)   BMI 31.48 kg/m²     Physical Exam   Constitutional: He is oriented to person, place, and time. Vital signs are normal. He appears well-developed and well-nourished. He is cooperative.  Non-toxic appearance. He does not have a sickly appearance. He does not appear ill.   Very pleasant, well-appearing older gentleman   HENT:   Head: Normocephalic and atraumatic.   Neck: Neck supple. No tracheal deviation present.   Pulmonary/Chest: Effort normal.   Musculoskeletal: Normal range of motion. He exhibits tenderness. Deformity: very mild tenderness surrounding.        Lumbar back: He exhibits  tenderness. He exhibits normal range of motion and no pain.   Wearing a well fitting LSO brace  Strength equal bilateral lower extremities, normal muscle bulk and tone  Deferred lumbar range of motion exam postop   Neurological: He is alert and oriented to person, place, and time. He has normal strength. No cranial nerve deficit or sensory deficit. Coordination and gait normal. GCS eye subscore is 4. GCS verbal subscore is 5.   Gait is stable and upright  Able to heel and toe walk   Skin: Skin is warm and dry.   Left abdominal and lumbar incision sites are flat, well approximated and healing well, normal surrounding skin   Psychiatric: He has a normal mood and affect. His behavior is normal. Thought content normal.   Vitals reviewed.    Neurologic Exam     Mental Status   Oriented to person, place, and time.     Motor Exam     Strength   Strength 5/5 throughout.       Findings/Results:  Postop x-rays reveal intact and stable surgical hardware, no new abnormalities identified    Assessment/Plan:  Damion was seen today for numbness and post-op.    Diagnoses and all orders for this visit:    Spinal stenosis, lumbar region, with neurogenic claudication  -     XR Spine Lumbar Complete With Flex & Ext; Future    Chronic right-sided low back pain with left-sided sciatica  -     XR Spine Lumbar Complete With Flex & Ext; Future    Other orders  -     HYDROcodone-acetaminophen (NORCO) 5-325 MG per tablet; Take 1 tablet by mouth Every 6 (Six) Hours As Needed for Moderate Pain .        Discussion/Summary  He returns to the office today for first postop visit.  Exam as noted above, no red flags.  He is doing very well and left leg pain has resolved postop.  He does report sensory change in the left anterior thigh but this is improving and is not associated with any discomfort.  He feels that the left leg is also stronger postop and he has been walking daily without any discomfort or issue.  I gave him a refill on the hydrocodone  5/325 mg tablets #30.  He is to wean off of this medication as tolerated.  He is okay to take Tylenol as needed.  I encouraged ongoing daily walking.  He is to avoid bending and twisting at the waist until his next office visit.  He is to continue wearing LSO brace as directed.  The incision sites are healing very well, including the left abdominal site.  He is to call our office with any questions or concerns.    Plan: Return to office in 3 months with lumbar flexion and extension x-rays.  Plan: Return in about 3 months (around 3/31/2019).         Patient Care Team    Patient Care Team:  Zahra Graham APRN as PCP - General (Family Medicine)    JENIFER Lin  12/31/2018    Dragon disclaimer:   Much of this encounter note is an electronic transcription/translation of spoken language to printed text. The electronic translation of spoken language may permit erroneous, or at times, nonsensical words or phrases to be inadvertently transcribed; Although I have reviewed the note for such errors, some may still exist.

## 2019-01-03 ENCOUNTER — READMISSION MANAGEMENT (OUTPATIENT)
Dept: CALL CENTER | Facility: HOSPITAL | Age: 69
End: 2019-01-03

## 2019-01-03 NOTE — OUTREACH NOTE
General Surgery Week 3 Survey      Responses   Facility patient discharged from?  [facility]   Does the patient have one of the following disease processes/diagnoses(primary or secondary)?  General Surgery   Week 3 attempt successful?  Yes   Call start time  1735   Call end time  1738   Discharge diagnosis  Lumbar radiculopathySpinal stenosis, lumbar region, with neurogenic claudicationLeft L4/5 obique lateral interbody fusion and interbody instrumentation    Is patient permission given to speak with other caregiver?  Yes   List who call center can speak with  spouse, Maricruz   Meds reviewed with patient/caregiver?  Yes   Is the patient having any side effects they believe may be caused by any medication additions or changes?  No   Does the patient have all medications related to this admission filled (includes all antibiotics, pain medications, etc.)  Yes   Is the patient taking all medications as directed (includes completed medication regime)?  Yes   Does the patient have a follow up appointment scheduled with their surgeon?  Yes   Has the patient kept scheduled appointments due by today?  Yes   Has home health visited the patient within 72 hours of discharge?  N/A   Psychosocial issues?  No   Did the patient receive a copy of their discharge instructions?  Yes   Nursing interventions  Reviewed instructions with patient   Nursing interventions  Nurse provided patient education   Is the patient /caregiver able to teach back basic post-op care?  Lifting as instructed by MD in discharge instructions, Keep incision areas clean,dry and protected, No tub bath, swimming, or hot tub until instructed by MD, Drive as instructed by MD in discharge instructions   Is the patient/caregiver able to teach back signs and symptoms of incisional infection?  Increased redness, swelling or pain at the incisonal site, Increased drainage or bleeding, Pus or odor from incision, Incisional warmth, Fever   Is the patient/caregiver able to  teach back steps to recovery at home?  Set small, achievable goals for return to baseline health, Rest and rebuild strength, gradually increase activity   Is the patient/caregiver able to teach back the hierarchy of who to call/visit for symptoms/problems? PCP, Specialist, Home health nurse, Urgent Care, ED, 911  Yes   Week 3 call completed?  Yes          Bailee Hassan RN

## 2019-01-10 ENCOUNTER — READMISSION MANAGEMENT (OUTPATIENT)
Dept: CALL CENTER | Facility: HOSPITAL | Age: 69
End: 2019-01-10

## 2019-01-10 NOTE — OUTREACH NOTE
General Surgery Week 4 Survey      Responses   Facility patient discharged from?  [facility]   Does the patient have one of the following disease processes/diagnoses(primary or secondary)?  General Surgery   Week 4 attempt successful?  Yes   Call start time  1613   Call end time  1615   Discharge diagnosis  Lumbar radiculopathySpinal stenosis, lumbar region, with neurogenic claudicationLeft L4/5 obique lateral interbody fusion and interbody instrumentation    Is the patient taking all medications as directed (includes completed medication regime)?  Yes   Has the patient kept scheduled appointments due by today?  Yes   Is the patient still receiving Home Health Services?  N/A   What is the patient's perception of their health status since discharge?  Improving   Week 4 call completed?  Yes   Would the patient like one additional call?  Yes   Wrap up additional comments  Having very little pain. Is going to ask surgeon when he can sleep on his abdomen.           Sarita Lara RN

## 2019-03-11 ENCOUNTER — OFFICE VISIT (OUTPATIENT)
Dept: NEUROSURGERY | Facility: CLINIC | Age: 69
End: 2019-03-11

## 2019-03-11 ENCOUNTER — HOSPITAL ENCOUNTER (OUTPATIENT)
Dept: GENERAL RADIOLOGY | Facility: HOSPITAL | Age: 69
Discharge: HOME | End: 2019-03-11
Admitting: NURSE PRACTITIONER

## 2019-03-11 VITALS
WEIGHT: 204 LBS | HEIGHT: 67 IN | DIASTOLIC BLOOD PRESSURE: 62 MMHG | BODY MASS INDEX: 32.02 KG/M2 | RESPIRATION RATE: 18 BRPM | HEART RATE: 108 BPM | SYSTOLIC BLOOD PRESSURE: 100 MMHG

## 2019-03-11 DIAGNOSIS — M48.062 SPINAL STENOSIS, LUMBAR REGION, WITH NEUROGENIC CLAUDICATION: ICD-10-CM

## 2019-03-11 DIAGNOSIS — Z09 POSTOP CHECK: Primary | ICD-10-CM

## 2019-03-11 DIAGNOSIS — G89.29 CHRONIC RIGHT-SIDED LOW BACK PAIN WITH LEFT-SIDED SCIATICA: ICD-10-CM

## 2019-03-11 DIAGNOSIS — M54.42 CHRONIC RIGHT-SIDED LOW BACK PAIN WITH LEFT-SIDED SCIATICA: ICD-10-CM

## 2019-03-11 DIAGNOSIS — R10.32 LEFT LOWER QUADRANT PAIN: ICD-10-CM

## 2019-03-11 PROCEDURE — 99024 POSTOP FOLLOW-UP VISIT: CPT | Performed by: NURSE PRACTITIONER

## 2019-03-11 PROCEDURE — 72114 X-RAY EXAM L-S SPINE BENDING: CPT

## 2019-03-11 NOTE — PROGRESS NOTES
Subjective   Patient ID: Damion Elizabeth is a 68 y.o. male is here today for 2nd post op status post spinal fusion .    History of Present Illness  Patient presents for follow-up of lumbar spinal stenosis and neurogenic claudication status post L4/5 OLIF with Dr. Verde on December 13, 2018. He reports no right leg pain but some aching pain in left anterior thigh that is intermittent. No significant weakness. He does reports some left lower abdominal pain for one week. It is intermittent and shooting pain to back. No N/V/ bowel issues. No fevers. H he continues to wear his brace during the day but takes it off at night when he is at home.    The following portions of the patient's history were reviewed and updated as appropriate: allergies, current medications and problem list.    Review of Systems   Musculoskeletal: Positive for back pain. Negative for gait problem.   Neurological: Negative for dizziness, light-headedness and numbness.   Psychiatric/Behavioral: Positive for sleep disturbance.       Objective   Physical Exam   Constitutional: He appears well-developed and well-nourished.   Cardiovascular: Normal rate and regular rhythm.   Pulmonary/Chest: Effort normal.   Abdominal: Soft. There is no hepatosplenomegaly. There is tenderness in the left lower quadrant. There is no rigidity. A hernia is present. Hernia confirmed positive in the ventral area (; Reducible superior to umbilicus; no palpable hernia left flank near incision).   Musculoskeletal:        Lumbar back: He exhibits no tenderness, no bony tenderness and no pain (Negative straight leg raise).   Neurological: He is alert. He has normal strength. Gait normal.   Skin: Skin is warm and dry.   Multiple well-healed lumbar incisions.  Well-healed left flank incisions   Vitals reviewed.    Neurologic Exam     Mental Status   Level of consciousness: alert    Motor Exam     Strength   Strength 5/5 throughout.     Gait, Coordination, and Reflexes      Gait  Gait: normal  Difficulty heel and toe walking on right secondary to multiple prior ankle surgeries/fracture       Assessment/Plan   Independent Review of Radiographic Studies:    Lumbar  x-rays from [facility] dated March 11, 2019 were reviewed.  These reveal postoperative changes with his mentation well-positioned and no abnormal motion with flexion extension.  Normal bowel gas pattern seen.    Medical Decision Making:    Patient presents for 3-month postoperative visit after L4/5 OLIF.  He has no significant back or leg pain at this time.  He has some mild intermittent discomfort in the left anterior thigh.  Strength is overall okay.  He is back to most regular activities.  He wears his brace only during the day.  He does complain the last week of having some left flank lower quadrant tenderness around his incision site.  He states it radiates around to his back.  It hits him in variable times.  It is not consistently related to anything in particular.    His exam is as noted above.  Lumbar x-rays show postoperative change with no complicating feature.  Her mentation properly positioned.  No neurologic red flags.  Strength and gait normal.  All surgical incisions well-healed.  He has generalized tenderness in the left lower quadrant region.  He is not having any other GI symptoms such as nausea, vomiting, bowel difficulties, hematochezia.  No fevers.  I will get a CT abdomen pelvis to evaluate, but there does not appear to be anything acutely concerning.  I did tell him if he develops any additional GI symptoms especially associated with fever, he should be seen in the emergency room.  We will call him with the results of the CT and any further follow-up that might be needed.  However, based on visit otherwise today, we will see him on a as needed basis.    Plan: CT abdomen pelvis and call patient with results.  Return to office as needed.  Wean brace.  Advised to avoid NSAIDs for a total  of 6 months from surgery.    Damion was seen today for post-op.    Diagnoses and all orders for this visit:    Postop check    Left lower quadrant pain  -     CT abdomen pelvis w wo contrast; Future      Return if symptoms worsen or fail to improve, for call patient with CT results and any further plans based on results.

## 2019-03-13 ENCOUNTER — TELEPHONE (OUTPATIENT)
Dept: NEUROSURGERY | Facility: CLINIC | Age: 69
End: 2019-03-13

## 2019-03-13 DIAGNOSIS — R10.32 LEFT LOWER QUADRANT PAIN: ICD-10-CM

## 2019-03-13 NOTE — TELEPHONE ENCOUNTER
CT scan report indicates no acute abnormalities.  Chronic L2 compression fracture that has been known.  There is evidence of gallstone as well as some chronic liver changes.  Please fax the CT scan to PCP office and advised him that we are sending it to them for any follow-up needed regarding the chronic liver changes.  Please advise patient that there is nothing on the CT scan that suggest a source of his left sided pain.  It is likely just scar irritation.  Hopefully with time it will improve.  Please advise him that the CT scan has been sent to his PCP.

## 2019-06-04 VITALS
HEART RATE: 59 BPM | OXYGEN SATURATION: 99 % | OXYGEN SATURATION: 99 % | BODY MASS INDEX: 20.53 KG/M2 | OXYGEN SATURATION: 98 % | BODY MASS INDEX: 20.53 KG/M2 | HEART RATE: 81 BPM | BODY MASS INDEX: 17.03 KG/M2 | DIASTOLIC BLOOD PRESSURE: 65 MMHG | DIASTOLIC BLOOD PRESSURE: 63 MMHG | DIASTOLIC BLOOD PRESSURE: 63 MMHG | WEIGHT: 139 LBS | WEIGHT: 139 LBS | HEART RATE: 63 BPM | BODY MASS INDEX: 17.03 KG/M2 | BODY MASS INDEX: 20.53 KG/M2 | SYSTOLIC BLOOD PRESSURE: 156 MMHG | DIASTOLIC BLOOD PRESSURE: 73 MMHG | WEIGHT: 115 LBS | HEIGHT: 69 IN | BODY MASS INDEX: 20.53 KG/M2 | WEIGHT: 115 LBS | HEART RATE: 59 BPM | SYSTOLIC BLOOD PRESSURE: 136 MMHG | WEIGHT: 139 LBS | BODY MASS INDEX: 20.53 KG/M2 | WEIGHT: 115 LBS | SYSTOLIC BLOOD PRESSURE: 120 MMHG | SYSTOLIC BLOOD PRESSURE: 120 MMHG | RESPIRATION RATE: 18 BRPM | HEART RATE: 66 BPM | OXYGEN SATURATION: 98 % | WEIGHT: 139 LBS | OXYGEN SATURATION: 98 % | RESPIRATION RATE: 18 BRPM | HEIGHT: 69 IN | DIASTOLIC BLOOD PRESSURE: 79 MMHG | SYSTOLIC BLOOD PRESSURE: 129 MMHG | SYSTOLIC BLOOD PRESSURE: 151 MMHG | DIASTOLIC BLOOD PRESSURE: 73 MMHG | OXYGEN SATURATION: 98 % | BODY MASS INDEX: 17.03 KG/M2 | HEART RATE: 66 BPM | HEIGHT: 69 IN | WEIGHT: 139 LBS

## 2019-10-01 RX ORDER — INSULIN LISPRO 100 [IU]/ML
INJECTION, SUSPENSION SUBCUTANEOUS
Qty: 15 ML | Refills: 0 | OUTPATIENT
Start: 2019-10-01

## 2019-11-27 RX ORDER — GABAPENTIN 600 MG/1
1 TABLET ORAL EVERY 8 HOURS
COMMUNITY
Start: 2018-02-08 | End: 2019-12-10 | Stop reason: SDDI

## 2019-11-27 RX ORDER — LANCETS 33 GAUGE
1 EACH MISCELLANEOUS DAILY
COMMUNITY
Start: 2017-12-19

## 2019-11-27 RX ORDER — GLIPIZIDE 10 MG/1
1 TABLET ORAL 2 TIMES DAILY
COMMUNITY
Start: 2015-02-11 | End: 2019-12-10 | Stop reason: SDDI

## 2019-11-27 RX ORDER — ISOSORBIDE MONONITRATE 30 MG/1
1 TABLET, EXTENDED RELEASE ORAL EVERY MORNING
COMMUNITY
Start: 2015-02-19 | End: 2019-12-10 | Stop reason: SDDI

## 2019-11-27 RX ORDER — LEVOTHYROXINE SODIUM 0.05 MG/1
1 TABLET ORAL DAILY
COMMUNITY
Start: 2015-02-11 | End: 2019-12-10 | Stop reason: SDDI

## 2019-11-27 RX ORDER — BLOOD-GLUCOSE METER
1 EACH MISCELLANEOUS DAILY
COMMUNITY
Start: 2017-12-22

## 2019-11-27 RX ORDER — CLOPIDOGREL BISULFATE 75 MG/1
1 TABLET ORAL DAILY
COMMUNITY
Start: 2015-02-11 | End: 2019-12-10 | Stop reason: SDDI

## 2019-11-27 RX ORDER — ASPIRIN 325 MG
1 TABLET ORAL DAILY
COMMUNITY
Start: 2015-02-11 | End: 2019-12-10 | Stop reason: SDDI

## 2019-11-27 RX ORDER — INSULIN LISPRO 100 [IU]/ML
40 INJECTION, SUSPENSION SUBCUTANEOUS 2 TIMES DAILY
COMMUNITY
Start: 2018-04-14 | End: 2019-12-10 | Stop reason: SDUPTHER

## 2019-11-27 RX ORDER — GLUCOSAMINE HCL/CHONDROITIN SU 500-400 MG
1 CAPSULE ORAL DAILY
COMMUNITY
Start: 2017-12-19

## 2019-11-27 RX ORDER — AMIODARONE HYDROCHLORIDE 200 MG/1
1 TABLET ORAL 2 TIMES DAILY
COMMUNITY
Start: 2015-02-11 | End: 2019-12-10 | Stop reason: SDDI

## 2019-11-27 RX ORDER — LISINOPRIL 5 MG/1
1 TABLET ORAL DAILY
COMMUNITY
Start: 2014-12-03 | End: 2019-12-10 | Stop reason: SDDI

## 2019-11-27 RX ORDER — BLOOD-GLUCOSE CONTROL, NORMAL
1 EACH MISCELLANEOUS AS NEEDED
COMMUNITY
Start: 2017-12-19

## 2019-11-27 RX ORDER — SIMVASTATIN 40 MG
1 TABLET ORAL DAILY
COMMUNITY
Start: 2014-12-03 | End: 2019-12-10 | Stop reason: SDDI

## 2019-11-27 RX ORDER — ATENOLOL 50 MG/1
1 TABLET ORAL DAILY
COMMUNITY
Start: 2014-12-03 | End: 2019-12-10 | Stop reason: SDDI

## 2019-12-10 ENCOUNTER — OFFICE VISIT (OUTPATIENT)
Dept: FAMILY MEDICINE CLINIC | Facility: CLINIC | Age: 69
End: 2019-12-10

## 2019-12-10 ENCOUNTER — LAB (OUTPATIENT)
Dept: FAMILY MEDICINE CLINIC | Facility: CLINIC | Age: 69
End: 2019-12-10

## 2019-12-10 VITALS
WEIGHT: 100 LBS | BODY MASS INDEX: 14.77 KG/M2 | OXYGEN SATURATION: 96 % | SYSTOLIC BLOOD PRESSURE: 117 MMHG | TEMPERATURE: 97 F | DIASTOLIC BLOOD PRESSURE: 63 MMHG | HEART RATE: 93 BPM

## 2019-12-10 DIAGNOSIS — E78.2 MIXED HYPERLIPIDEMIA: ICD-10-CM

## 2019-12-10 DIAGNOSIS — E03.9 HYPOTHYROIDISM, UNSPECIFIED TYPE: ICD-10-CM

## 2019-12-10 DIAGNOSIS — E11.65 TYPE 2 DIABETES MELLITUS WITH HYPERGLYCEMIA, WITH LONG-TERM CURRENT USE OF INSULIN (HCC): Primary | ICD-10-CM

## 2019-12-10 DIAGNOSIS — Z79.4 TYPE 2 DIABETES MELLITUS WITH HYPERGLYCEMIA, WITH LONG-TERM CURRENT USE OF INSULIN (HCC): ICD-10-CM

## 2019-12-10 DIAGNOSIS — I10 ESSENTIAL HYPERTENSION: ICD-10-CM

## 2019-12-10 DIAGNOSIS — E11.65 TYPE 2 DIABETES MELLITUS WITH HYPERGLYCEMIA, WITH LONG-TERM CURRENT USE OF INSULIN (HCC): ICD-10-CM

## 2019-12-10 DIAGNOSIS — Z79.4 TYPE 2 DIABETES MELLITUS WITH HYPERGLYCEMIA, WITH LONG-TERM CURRENT USE OF INSULIN (HCC): Primary | ICD-10-CM

## 2019-12-10 LAB
ALBUMIN SERPL-MCNC: 3.8 G/DL (ref 3.5–5.2)
ALBUMIN UR-MCNC: 25.5 MG/DL
ALBUMIN/GLOB SERPL: 1.1 G/DL
ALP SERPL-CCNC: 69 U/L (ref 39–117)
ALT SERPL W P-5'-P-CCNC: 10 U/L (ref 1–41)
ANION GAP SERPL CALCULATED.3IONS-SCNC: 14.3 MMOL/L (ref 5–15)
AST SERPL-CCNC: 13 U/L (ref 1–40)
BASOPHILS # BLD AUTO: 0.23 10*3/MM3 (ref 0–0.2)
BASOPHILS NFR BLD AUTO: 2.1 % (ref 0–1.5)
BILIRUB SERPL-MCNC: 0.8 MG/DL (ref 0.2–1.2)
BUN BLD-MCNC: 8 MG/DL (ref 8–23)
BUN/CREAT SERPL: 12.7 (ref 7–25)
CALCIUM SPEC-SCNC: 9.5 MG/DL (ref 8.6–10.5)
CHLORIDE SERPL-SCNC: 104 MMOL/L (ref 98–107)
CHOLEST SERPL-MCNC: 174 MG/DL (ref 0–200)
CO2 SERPL-SCNC: 24.7 MMOL/L (ref 22–29)
CREAT BLD-MCNC: 0.63 MG/DL (ref 0.76–1.27)
DEPRECATED RDW RBC AUTO: 40.7 FL (ref 37–54)
EOSINOPHIL # BLD AUTO: 1.27 10*3/MM3 (ref 0–0.4)
EOSINOPHIL NFR BLD AUTO: 11.6 % (ref 0.3–6.2)
ERYTHROCYTE [DISTWIDTH] IN BLOOD BY AUTOMATED COUNT: 12.4 % (ref 12.3–15.4)
GFR SERPL CREATININE-BSD FRML MDRD: 127 ML/MIN/1.73
GLOBULIN UR ELPH-MCNC: 3.5 GM/DL
GLUCOSE BLD-MCNC: 145 MG/DL (ref 65–99)
HBA1C MFR BLD: 7.8 % (ref 3.5–5.6)
HCT VFR BLD AUTO: 51.3 % (ref 37.5–51)
HDLC SERPL-MCNC: 51 MG/DL (ref 40–60)
HGB BLD-MCNC: 17.3 G/DL (ref 13–17.7)
IMM GRANULOCYTES # BLD AUTO: 0.02 10*3/MM3 (ref 0–0.05)
IMM GRANULOCYTES NFR BLD AUTO: 0.2 % (ref 0–0.5)
LDLC SERPL CALC-MCNC: 105 MG/DL (ref 0–100)
LDLC/HDLC SERPL: 2.05 {RATIO}
LYMPHOCYTES # BLD AUTO: 2.32 10*3/MM3 (ref 0.7–3.1)
LYMPHOCYTES NFR BLD AUTO: 21.1 % (ref 19.6–45.3)
MCH RBC QN AUTO: 30.1 PG (ref 26.6–33)
MCHC RBC AUTO-ENTMCNC: 33.7 G/DL (ref 31.5–35.7)
MCV RBC AUTO: 89.4 FL (ref 79–97)
MONOCYTES # BLD AUTO: 1.1 10*3/MM3 (ref 0.1–0.9)
MONOCYTES NFR BLD AUTO: 10 % (ref 5–12)
NEUTROPHILS # BLD AUTO: 6.03 10*3/MM3 (ref 1.7–7)
NEUTROPHILS NFR BLD AUTO: 55 % (ref 42.7–76)
NRBC BLD AUTO-RTO: 0 /100 WBC (ref 0–0.2)
PLATELET # BLD AUTO: 313 10*3/MM3 (ref 140–450)
PMV BLD AUTO: 11.2 FL (ref 6–12)
POTASSIUM BLD-SCNC: 4.6 MMOL/L (ref 3.5–5.2)
PROT SERPL-MCNC: 7.3 G/DL (ref 6–8.5)
RBC # BLD AUTO: 5.74 10*6/MM3 (ref 4.14–5.8)
SODIUM BLD-SCNC: 143 MMOL/L (ref 136–145)
TRIGL SERPL-MCNC: 91 MG/DL (ref 0–150)
TSH SERPL DL<=0.05 MIU/L-ACNC: 3.54 UIU/ML (ref 0.27–4.2)
VLDLC SERPL-MCNC: 18.2 MG/DL (ref 5–40)
WBC NRBC COR # BLD: 10.97 10*3/MM3 (ref 3.4–10.8)

## 2019-12-10 PROCEDURE — 82043 UR ALBUMIN QUANTITATIVE: CPT | Performed by: FAMILY MEDICINE

## 2019-12-10 PROCEDURE — 85025 COMPLETE CBC W/AUTO DIFF WBC: CPT | Performed by: FAMILY MEDICINE

## 2019-12-10 PROCEDURE — 36415 COLL VENOUS BLD VENIPUNCTURE: CPT

## 2019-12-10 PROCEDURE — 83036 HEMOGLOBIN GLYCOSYLATED A1C: CPT | Performed by: FAMILY MEDICINE

## 2019-12-10 PROCEDURE — 80053 COMPREHEN METABOLIC PANEL: CPT | Performed by: FAMILY MEDICINE

## 2019-12-10 PROCEDURE — 84443 ASSAY THYROID STIM HORMONE: CPT | Performed by: FAMILY MEDICINE

## 2019-12-10 PROCEDURE — 80061 LIPID PANEL: CPT | Performed by: FAMILY MEDICINE

## 2019-12-10 PROCEDURE — 99214 OFFICE O/P EST MOD 30 MIN: CPT | Performed by: FAMILY MEDICINE

## 2019-12-10 RX ORDER — INSULIN LISPRO 100 [IU]/ML
40 INJECTION, SUSPENSION SUBCUTANEOUS 2 TIMES DAILY WITH MEALS
Qty: 3 PEN | Refills: 3 | Status: SHIPPED | OUTPATIENT
Start: 2019-12-10 | End: 2020-02-08

## 2019-12-10 RX ORDER — LISINOPRIL 5 MG/1
5 TABLET ORAL DAILY
Qty: 90 TABLET | Refills: 2 | Status: SHIPPED | OUTPATIENT
Start: 2019-12-10

## 2019-12-10 NOTE — PROGRESS NOTES
Subjective   Gab Pedraza Sr. is a 69 y.o. male.     Diabetes   He presents for his follow-up diabetic visit. He has type 2 diabetes mellitus. Pertinent negatives for hypoglycemia include no confusion, dizziness, headaches, nervousness/anxiousness or tremors. Pertinent negatives for diabetes include no blurred vision, no chest pain, no fatigue, no polydipsia, no polyphagia, no polyuria and no weakness. Symptoms are stable. Risk factors for coronary artery disease include hypertension, dyslipidemia and diabetes mellitus. Current diabetic treatment includes insulin injections.   HTN    The patient present for six-month follow-up on hypertension and hyperlipidemia. The problem has been gradually improving since onset. The problem is controlled. Pertinent negatives include no anxiety, blurred vision, chest pain, palpitations, peripheral edema or shortness of breath. Risk factors for coronary artery disease include dyslipidemia. Current antihypertension treatment includes ACE inhibitors  and lifestyle changes.       The following portions of the patient's history were reviewed and updated as appropriate: past medical history, past social history, past surgical history and problem list.    Review of Systems   Constitutional: Negative for fatigue and fever.   HENT: Negative for ear pain, postnasal drip, sinus pressure and sore throat.    Eyes: Negative for blurred vision.   Respiratory: Negative for cough, shortness of breath and wheezing.    Cardiovascular: Negative for chest pain, palpitations and leg swelling.   Gastrointestinal: Negative for abdominal pain, diarrhea, nausea and vomiting.   Endocrine: Negative for cold intolerance, polydipsia, polyphagia and polyuria.   Musculoskeletal: Negative for back pain.   Neurological: Negative for dizziness, tremors, weakness, headache and confusion.   Psychiatric/Behavioral: Negative for depressed mood. The patient is not nervous/anxious.        Objective   Physical Exam    Constitutional: He is oriented to person, place, and time. He appears well-developed.   HENT:   Right Ear: External ear normal.   Left Ear: External ear normal.   Mouth/Throat: Oropharynx is clear and moist.   Eyes: Pupils are equal, round, and reactive to light. Conjunctivae and EOM are normal.   Neck: Normal range of motion. Neck supple. No thyromegaly present.   Cardiovascular: Normal rate, regular rhythm and normal heart sounds.   Pulmonary/Chest: Effort normal and breath sounds normal. He has no wheezes.   Abdominal: Soft. Bowel sounds are normal.   Musculoskeletal:   Bilateral AKA - wheelchair bound.   Neurological: He is alert and oriented to person, place, and time.   Psychiatric: He has a normal mood and affect.   Vitals reviewed.        Assessment/Plan   Problems Addressed this Visit        Cardiovascular and Mediastinum    Hypertension     Hypertension is improving with treatment.  Continue current treatment regimen.  Dietary sodium restriction.  Stop smoking.  Blood pressure will be reassessed in 3 months.         Relevant Medications    lisinopril (PRINIVIL,ZESTRIL) 5 MG tablet    Other Relevant Orders    Lipid panel (Completed)    Comprehensive metabolic panel (Completed)    CBC w AUTO Differential (Completed)    Hyperlipidemia     Lipid abnormalities are unchanged.  Nutritional counseling was provided.  Lipids will be reassessed in 6 months.         Relevant Orders    Lipid panel (Completed)    Comprehensive metabolic panel (Completed)    CBC w AUTO Differential (Completed)    TSH (Completed)       Endocrine    Hypothyroidism    Relevant Orders    CBC w AUTO Differential (Completed)    TSH (Completed)    Type 2 diabetes mellitus with hyperglycemia, with long-term current use of insulin (CMS/[facility]) - Primary     Diabetes is unchanged.   Continue current treatment regimen.  Reminded to bring in blood sugar diary at next visit.  Dietary recommendations for ADA diet.  Discussed ways to avoid symptomatic  hypoglycemia.  Discussed sick day management.  Reminded to get yearly retinal exam.  Diabetes will be reassessed in 3 months.         Relevant Medications    Insulin Lispro Prot & Lispro (HUMALOG MIX 75/25 KWIKPEN) (75-25) 100 UNIT/ML suspension pen-injector pen    Other Relevant Orders    Lipid panel (Completed)    Comprehensive metabolic panel (Completed)    CBC w AUTO Differential (Completed)    MicroAlbumin, Urine, Random - Urine, Clean Catch (Completed)    Hemoglobin A1c (Completed)

## 2019-12-31 NOTE — ASSESSMENT & PLAN NOTE
Hypertension is improving with treatment.  Continue current treatment regimen.  Dietary sodium restriction.  Stop smoking.  Blood pressure will be reassessed in 3 months.

## 2019-12-31 NOTE — ASSESSMENT & PLAN NOTE
Diabetes is unchanged.   Continue current treatment regimen.  Reminded to bring in blood sugar diary at next visit.  Dietary recommendations for ADA diet.  Discussed ways to avoid symptomatic hypoglycemia.  Discussed sick day management.  Reminded to get yearly retinal exam.  Diabetes will be reassessed in 3 months.

## 2020-02-08 RX ORDER — INSULIN LISPRO 100 [IU]/ML
INJECTION, SUSPENSION SUBCUTANEOUS
Qty: 9 ML | Refills: 4 | Status: SHIPPED | OUTPATIENT
Start: 2020-02-08 | End: 2020-02-08 | Stop reason: SDUPTHER

## 2020-02-08 RX ORDER — INSULIN LISPRO 100 [IU]/ML
INJECTION, SUSPENSION SUBCUTANEOUS
Qty: 9 ML | Refills: 3 | Status: SHIPPED | OUTPATIENT
Start: 2020-02-08 | End: 2020-02-20 | Stop reason: SDUPTHER

## 2020-02-20 RX ORDER — INSULIN LISPRO 100 [IU]/ML
40 INJECTION, SUSPENSION SUBCUTANEOUS 2 TIMES DAILY WITH MEALS
Qty: 15 ML | Refills: 3 | Status: SHIPPED | OUTPATIENT
Start: 2020-02-20 | End: 2020-04-24

## 2020-04-23 ENCOUNTER — TELEPHONE (OUTPATIENT)
Dept: FAMILY MEDICINE CLINIC | Facility: CLINIC | Age: 70
End: 2020-04-23

## 2020-04-23 NOTE — TELEPHONE ENCOUNTER
[facility] sent PA for Humalog Mix 75/25.  [facility] PA said Humalog Mix is non-formulary.    Preferred: Novolog Mix (any delivery formulation)

## 2020-07-07 RX ORDER — ATORVASTATIN CALCIUM 10 MG/1
10 TABLET, FILM COATED ORAL DAILY
Qty: 90 TABLET | Refills: 3 | Status: SHIPPED | OUTPATIENT
Start: 2020-07-07

## 2020-07-07 RX ORDER — ATORVASTATIN CALCIUM 10 MG/1
10 TABLET, FILM COATED ORAL DAILY
Qty: 30 TABLET | Refills: 3 | Status: SHIPPED | OUTPATIENT
Start: 2020-07-07 | End: 2020-07-07

## (undated) DEVICE — ANTIBACTERIAL UNDYED BRAIDED (POLYGLACTIN 910), SYNTHETIC ABSORBABLE SUTURE: Brand: COATED VICRYL

## (undated) DEVICE — NDL HYPO PRECISIONGLIDE REG 25G 1 1/2

## (undated) DEVICE — SUT PDS 1 XLH LP 99IN Z881G

## (undated) DEVICE — ELECTRD BLD EDGE/INSUL1P 2.4X5.1MM STRL

## (undated) DEVICE — TOTAL TRAY, 16FR 10ML SIL FOLEY, URN: Brand: MEDLINE

## (undated) DEVICE — SUT VIC 3/0 CTI 36IN J944H

## (undated) DEVICE — GOWN,PREVENTION PLUS,XLONG/XLARGE,STRL: Brand: MEDLINE

## (undated) DEVICE — SUT VIC 1 CT1 36IN J947H

## (undated) DEVICE — 1010 S-DRAPE TOWEL DRAPE 10/BX: Brand: STERI-DRAPE™

## (undated) DEVICE — SPONGE,LAP,12"X12",XR,ST,5/PK,40PK/CS: Brand: MEDLINE

## (undated) DEVICE — ELECTRD BLD EXT EDGE/INSUL 6IN

## (undated) DEVICE — GLV SURG BIOGEL LTX PF 8 1/2

## (undated) DEVICE — GLV SURG BIOGEL LTX PF 7 1/2

## (undated) DEVICE — CABLE 2344000 POWEREASE NIM: Brand: POWEREASE™ INSTRUMENTS

## (undated) DEVICE — DRP SLUSH WARMR MACH 52X66IN OM-ORS-301

## (undated) DEVICE — ADHS SKIN DERMABOND TOP ADVANCED

## (undated) DEVICE — UNIVERSAL PACK: Brand: CARDINAL HEALTH

## (undated) DEVICE — PIN, 9733235, 100MM, STERILE, PERC REF

## (undated) DEVICE — GOWN,PREVENTION PLUS,XXLARGE,STERILE: Brand: MEDLINE

## (undated) DEVICE — ENCORE® LATEX ORTHO SIZE 8.5, STERILE LATEX POWDER-FREE SURGICAL GLOVE: Brand: ENCORE

## (undated) DEVICE — SUT SILK 3/0 SUTUPAK TIES 24IN SA74H

## (undated) DEVICE — SUT PDS 1 CT1 36IN Z347H

## (undated) DEVICE — GUIDEWIRE 2345050 BLUNT THREADED 24IN

## (undated) DEVICE — 3M™ IOBAN™ 2 ANTIMICROBIAL INCISE DRAPE 6650EZ: Brand: IOBAN™ 2

## (undated) DEVICE — CONN TBG Y 5 IN 1 LF STRL

## (undated) DEVICE — SUT SILK 2/0 SUTUPAK TIES 24IN SA75H

## (undated) DEVICE — MEDI-VAC YANKAUER SUCTION HANDLE W/BULBOUS TIP: Brand: CARDINAL HEALTH

## (undated) DEVICE — NDL SPINE 20G 3 1/2 YEL STRL 1P/U

## (undated) DEVICE — SHLD ANGIO 2LAYR CIR FEN

## (undated) DEVICE — TOWEL,OR,DSP,ST,BLUE,STD,4/PK,20PK/CS: Brand: MEDLINE

## (undated) DEVICE — SUT VIC 1 CT 36IN J959H

## (undated) DEVICE — PK NEURO SPINE 40

## (undated) DEVICE — LT SOURCE STR TP

## (undated) DEVICE — DRP STRL STERILEZ ZFLD

## (undated) DEVICE — GLV SURG SENSICARE PI PF LF 8.5 GRN STRL

## (undated) DEVICE — SPHR MARKR STEALTH STATION

## (undated) DEVICE — DRSNG WND GZ PAD BORDERED 4X8IN STRL

## (undated) DEVICE — SPNG LAP 18X18IN LF STRL PK/5

## (undated) DEVICE — Device

## (undated) DEVICE — SMOKE EVACUATION TUBING WITH 7/8 IN TO 1/4 IN REDUCER: Brand: BUFFALO FILTER

## (undated) DEVICE — APPL CHLORAPREP W/TINT 26ML ORNG

## (undated) DEVICE — ENDOPATH 10MM ENDOSCOPIC BLUNT CHERRY DISSECTORS (12 POUCHES CONTAINING 3 DISSECTORS EACH): Brand: ENDOPATH

## (undated) DEVICE — DISPOSABLE BIPOLAR CABLE 12FT. (3.6M): Brand: KIRWAN

## (undated) DEVICE — POOLE SUCTION HANDLE: Brand: CARDINAL HEALTH

## (undated) DEVICE — GLV SURG SENSICARE MICRO PF LF 8 STRL